# Patient Record
Sex: FEMALE | Race: BLACK OR AFRICAN AMERICAN | NOT HISPANIC OR LATINO | Employment: OTHER | URBAN - METROPOLITAN AREA
[De-identification: names, ages, dates, MRNs, and addresses within clinical notes are randomized per-mention and may not be internally consistent; named-entity substitution may affect disease eponyms.]

---

## 2017-10-09 ENCOUNTER — TRANSCRIBE ORDERS (OUTPATIENT)
Dept: ADMINISTRATIVE | Facility: HOSPITAL | Age: 62
End: 2017-10-09

## 2017-10-09 DIAGNOSIS — Z12.31 VISIT FOR SCREENING MAMMOGRAM: Primary | ICD-10-CM

## 2017-12-04 ENCOUNTER — HOSPITAL ENCOUNTER (OUTPATIENT)
Dept: MAMMOGRAPHY | Facility: HOSPITAL | Age: 62
Discharge: HOME | End: 2017-12-04
Admitting: FAMILY MEDICINE

## 2017-12-04 DIAGNOSIS — Z12.31 VISIT FOR SCREENING MAMMOGRAM: ICD-10-CM

## 2017-12-04 PROCEDURE — G0202 SCR MAMMO BI INCL CAD: HCPCS

## 2017-12-04 PROCEDURE — 77063 BREAST TOMOSYNTHESIS BI: CPT

## 2017-12-04 PROCEDURE — 77067 SCR MAMMO BI INCL CAD: CPT | Performed by: RADIOLOGY

## 2017-12-04 PROCEDURE — 77063 BREAST TOMOSYNTHESIS BI: CPT | Performed by: RADIOLOGY

## 2019-10-08 ENCOUNTER — TRANSCRIBE ORDERS (OUTPATIENT)
Dept: ADMINISTRATIVE | Facility: HOSPITAL | Age: 64
End: 2019-10-08

## 2019-10-08 DIAGNOSIS — Z12.31 VISIT FOR SCREENING MAMMOGRAM: Primary | ICD-10-CM

## 2019-12-18 ENCOUNTER — HOSPITAL ENCOUNTER (OUTPATIENT)
Dept: MAMMOGRAPHY | Facility: HOSPITAL | Age: 64
Discharge: HOME | End: 2019-12-18
Admitting: FAMILY MEDICINE

## 2019-12-18 DIAGNOSIS — Z12.31 VISIT FOR SCREENING MAMMOGRAM: ICD-10-CM

## 2019-12-18 PROCEDURE — 77067 SCR MAMMO BI INCL CAD: CPT

## 2019-12-18 PROCEDURE — 77063 BREAST TOMOSYNTHESIS BI: CPT | Performed by: RADIOLOGY

## 2019-12-18 PROCEDURE — 77067 SCR MAMMO BI INCL CAD: CPT | Performed by: RADIOLOGY

## 2019-12-18 PROCEDURE — 77063 BREAST TOMOSYNTHESIS BI: CPT

## 2020-12-07 ENCOUNTER — TRANSCRIBE ORDERS (OUTPATIENT)
Dept: ADMINISTRATIVE | Facility: HOSPITAL | Age: 65
End: 2020-12-07

## 2020-12-07 DIAGNOSIS — Z12.31 VISIT FOR SCREENING MAMMOGRAM: Primary | ICD-10-CM

## 2020-12-31 ENCOUNTER — HOSPITAL ENCOUNTER (OUTPATIENT)
Dept: MAMMOGRAPHY | Facility: HOSPITAL | Age: 65
Discharge: HOME | End: 2020-12-31
Admitting: PHYSICIAN ASSISTANT

## 2020-12-31 DIAGNOSIS — Z12.31 VISIT FOR SCREENING MAMMOGRAM: ICD-10-CM

## 2020-12-31 PROCEDURE — 77067 SCR MAMMO BI INCL CAD: CPT

## 2020-12-31 PROCEDURE — 77067 SCR MAMMO BI INCL CAD: CPT | Performed by: RADIOLOGY

## 2020-12-31 PROCEDURE — 77063 BREAST TOMOSYNTHESIS BI: CPT | Performed by: RADIOLOGY

## 2020-12-31 PROCEDURE — 77063 BREAST TOMOSYNTHESIS BI: CPT

## 2021-05-05 ENCOUNTER — OFFICE VISIT (OUTPATIENT)
Dept: OBSTETRICS AND GYNECOLOGY | Facility: CLINIC | Age: 66
End: 2021-05-05

## 2021-05-05 VITALS
SYSTOLIC BLOOD PRESSURE: 110 MMHG | HEIGHT: 62 IN | BODY MASS INDEX: 25.98 KG/M2 | WEIGHT: 141.2 LBS | DIASTOLIC BLOOD PRESSURE: 72 MMHG

## 2021-05-05 DIAGNOSIS — Z12.4 SCREENING FOR CERVICAL CANCER: ICD-10-CM

## 2021-05-05 DIAGNOSIS — I10 ESSENTIAL HYPERTENSION: ICD-10-CM

## 2021-05-05 DIAGNOSIS — N95.2 ATROPHIC VAGINITIS: ICD-10-CM

## 2021-05-05 DIAGNOSIS — Z12.11 SCREEN FOR COLON CANCER: ICD-10-CM

## 2021-05-05 DIAGNOSIS — E11.9 TYPE 2 DIABETES MELLITUS WITHOUT COMPLICATION, WITHOUT LONG-TERM CURRENT USE OF INSULIN (HCC): ICD-10-CM

## 2021-05-05 DIAGNOSIS — A60.04 HERPES SIMPLEX VULVOVAGINITIS: ICD-10-CM

## 2021-05-05 DIAGNOSIS — Z01.419 ENCOUNTER FOR ANNUAL ROUTINE GYNECOLOGICAL EXAMINATION: Primary | ICD-10-CM

## 2021-05-05 DIAGNOSIS — Z12.31 ENCOUNTER FOR SCREENING MAMMOGRAM FOR MALIGNANT NEOPLASM OF BREAST: ICD-10-CM

## 2021-05-05 PROBLEM — Z12.39 SCREENING FOR BREAST CANCER: Status: ACTIVE | Noted: 2021-05-05

## 2021-05-05 PROCEDURE — 99386 PREV VISIT NEW AGE 40-64: CPT | Performed by: OBSTETRICS & GYNECOLOGY

## 2021-05-05 RX ORDER — FLUTICASONE PROPIONATE 50 MCG
SPRAY, SUSPENSION (ML) NASAL
COMMUNITY

## 2021-05-05 RX ORDER — VALACYCLOVIR HYDROCHLORIDE 500 MG/1
TABLET, FILM COATED ORAL
COMMUNITY

## 2021-05-05 RX ORDER — LISINOPRIL AND HYDROCHLOROTHIAZIDE 25; 20 MG/1; MG/1
1 TABLET ORAL 2 TIMES DAILY
COMMUNITY
Start: 2021-03-20

## 2021-05-05 RX ORDER — GLYBURIDE 5 MG/1
5 TABLET ORAL 2 TIMES DAILY
COMMUNITY
Start: 2021-03-20

## 2021-05-05 NOTE — PROGRESS NOTES
GYN Annual Exam     CC - Here for annual exam.        HPI  Marium Evans is a 65 y.o. female, , who presents for annual well woman exam.  She is postmenopausal.  Patient denies vaginal bleeding. ..  Patient reports problems with: hot flashes and vaginal dryness. There were no changes to her medical or surgical history since her last visit.. Partner Status: Marital Status: .  New Partners since last visit: no.      Patient does complain of right groin pain abut unsure what could be causing it.     Additional OB/GYN History   Current contraception: contraceptive methods: Post menopausal status  Desires to: do not start contraception  On HRT? No  Last Pap :   Last Completed Pap Smear     This patient has no relevant Health Maintenance data.        History of abnormal Pap smear: no  Family history of uterine, colon, breast, or ovarian cancer: yes - breast cancer- maternal aunt  Performs monthly Self-Breast Exam: no  Last mammogram:   Last Completed Mammogram          MAMMOGRAM (Every 2 Years) Next due on 2022  Mammo Screening Digital Tomosynthesis Bilateral With CAD    2019  Mammo Screening Digital Tomosynthesis Bilateral With CAD    2017  Mammo Screening Digital Tomosynthesis Bilateral With CAD    2016  Mammo Screening Digital Tomosynthesis Bilateral With CAD    2015  Mammo screening bilateral    Only the first 5 history entries have been loaded, but more history exists.              Last colonoscopy:   Last Completed Colonoscopy     This patient has no relevant Health Maintenance data.        Last DEXA: Unknown date and results were Normal  Exercises Regularly: yes  Feelings of Anxiety or Depression: no      Tobacco Usage?: No   OB History        2    Para   2    Term   2            AB        Living           SAB        TAB        Ectopic        Molar        Multiple        Live Births                    Health Maintenance   Topic Date Due   •  "URINE MICROALBUMIN  Never done   • COLORECTAL CANCER SCREENING  Never done   • ANNUAL PHYSICAL  Never done   • Pneumococcal Vaccine 0-64 (1 of 1 - PPSV23) Never done   • ZOSTER VACCINE (1 of 2) Never done   • HEPATITIS C SCREENING  Never done   • DIABETIC FOOT EXAM  Never done   • PAP SMEAR  05/05/2021   • HEMOGLOBIN A1C  Never done   • DIABETIC EYE EXAM  Never done   • INFLUENZA VACCINE  08/01/2021   • Annual Gynecologic Pelvic and Breast Exam  05/06/2022   • MAMMOGRAM  12/31/2022   • TDAP/TD VACCINES (3 - Td) 06/19/2028   • COVID-19 Vaccine  Completed       The additional following portions of the patient's history were reviewed and updated as appropriate: allergies, current medications, past family history, past medical history, past social history, past surgical history and problem list.    Review of Systems   Constitutional: Negative.    HENT: Negative.    Eyes: Negative.    Respiratory: Negative.    Cardiovascular: Negative.    Gastrointestinal: Negative.    Endocrine: Negative.    Genitourinary: Negative.    Musculoskeletal: Negative.    Skin: Negative.    Allergic/Immunologic: Negative.    Neurological: Negative.    Hematological: Negative.    Psychiatric/Behavioral: Negative.      All other systems reviewed and are negative.     I have reviewed and agree with the HPI, ROS, and historical information as entered above. Regis Atkinson MD    Objective   /72   Ht 157.5 cm (62\")   Wt 64 kg (141 lb 3.2 oz)   BMI 25.83 kg/m²     Physical Exam  Vitals and nursing note reviewed. Exam conducted with a chaperone present.   Constitutional:       Appearance: She is well-developed.   HENT:      Head: Normocephalic and atraumatic.   Neck:      Thyroid: No thyroid mass or thyromegaly.   Cardiovascular:      Rate and Rhythm: Normal rate and regular rhythm.      Heart sounds: No murmur heard.     Pulmonary:      Effort: Pulmonary effort is normal. No retractions.      Breath sounds: Normal breath sounds. No " wheezing, rhonchi or rales.   Chest:      Chest wall: No mass or tenderness.      Breasts:         Right: Normal. No mass, nipple discharge, skin change or tenderness.         Left: Normal. No mass, nipple discharge, skin change or tenderness.   Abdominal:      General: Bowel sounds are normal.      Palpations: Abdomen is soft. Abdomen is not rigid. There is no mass.      Tenderness: There is no abdominal tenderness. There is no guarding.      Hernia: No hernia is present. There is no hernia in the left inguinal area.   Genitourinary:     Labia:         Right: No rash, tenderness or lesion.         Left: No rash, tenderness or lesion.       Vagina: Normal. No vaginal discharge or lesions.      Cervix: No cervical motion tenderness, discharge, lesion or cervical bleeding.      Uterus: Normal. Not enlarged, not fixed and not tender.       Adnexa:         Right: No mass or tenderness.          Left: No mass or tenderness.        Rectum: No external hemorrhoid.      Comments: Atrophy and flattening of vaginal rugate.  Cervix without lesions or discharge.  Uterus nontender anteverted.  Ovaries nonpalpable and nontender.  Musculoskeletal:      Cervical back: Normal range of motion. No muscular tenderness.   Neurological:      Mental Status: She is alert and oriented to person, place, and time.   Psychiatric:         Behavior: Behavior normal.            Assessment and Plan    Problem List Items Addressed This Visit     Screening for breast cancer    Overview     Mamm 11/2020; normal         Screen for colon cancer    Overview     Colonoscopy 2017? 5 year repeat.          Atrophic vaginitis    Overview     Declines treatment at this time.         Herpes genitalis    Overview     Continues take Valtrex for suppression.         Relevant Medications    valACYclovir (Valtrex) 500 MG tablet    Hypertension    Overview     Stable on lisinopril with hydrochlorothiazide         Relevant Medications     lisinopril-hydrochlorothiazide (PRINZIDE,ZESTORETIC) 20-25 MG per tablet    Diabetes mellitus (CMS/HCC)    Overview     On glyburide and Metformin.         Relevant Medications    metFORMIN (GLUCOPHAGE) 500 MG tablet    glyburide (DIAbeta) 5 MG tablet    Screening for cervical cancer    Overview     Pap smear done 5/5/2021.           Other Visit Diagnoses     Encounter for annual routine gynecological examination    -  Primary    Relevant Orders    Pap IG, Rfx HPV ASCU          1. GYN annual well woman exam.  65 years of age.  Pap smear done today.  Mammogram due in November 2021.  2. Colonoscopy 2017.?  3. Reviewed monthly self breast exams.  Instructed to call with lumps, pain, or breast discharge.  Yearly mammograms ordered.  4. Recommended use of Vitamin D and getting adequate calcium in her diet. (1500mg)  5. Reviewed BMI and weight loss as preventative health measures.   6. Reccommended Flu Vaccine in Fall of each year.  7. RTC in 1 year or PRN with problems.    Regis Atkinson MD  05/05/2021

## 2021-05-11 DIAGNOSIS — Z01.419 ENCOUNTER FOR ANNUAL ROUTINE GYNECOLOGICAL EXAMINATION: ICD-10-CM

## 2021-10-14 ENCOUNTER — HOSPITAL ENCOUNTER (INPATIENT)
Facility: HOSPITAL | Age: 66
LOS: 2 days | Discharge: HOME | End: 2021-10-17
Attending: EMERGENCY MEDICINE | Admitting: INTERNAL MEDICINE

## 2021-10-14 DIAGNOSIS — R79.89 ELEVATED LACTIC ACID LEVEL: ICD-10-CM

## 2021-10-14 DIAGNOSIS — Z86.39 HISTORY OF DIABETES MELLITUS: ICD-10-CM

## 2021-10-14 DIAGNOSIS — Z98.890 HISTORY OF ABDOMINAL SURGERY: ICD-10-CM

## 2021-10-14 DIAGNOSIS — Z86.79 HISTORY OF HYPERTENSION: ICD-10-CM

## 2021-10-14 DIAGNOSIS — K56.609 SMALL BOWEL OBSTRUCTION (HCC): Primary | ICD-10-CM

## 2021-10-14 DIAGNOSIS — R11.2 NON-INTRACTABLE VOMITING WITH NAUSEA, UNSPECIFIED VOMITING TYPE: ICD-10-CM

## 2021-10-14 LAB
ALBUMIN SERPL-MCNC: 4.9 G/DL (ref 3.5–5.2)
ALBUMIN/GLOB SERPL: 1.8 G/DL
ALP SERPL-CCNC: 81 U/L (ref 39–117)
ALT SERPL W P-5'-P-CCNC: 6 U/L (ref 1–33)
ANION GAP SERPL CALCULATED.3IONS-SCNC: 16 MMOL/L (ref 5–15)
AST SERPL-CCNC: 15 U/L (ref 1–32)
BASOPHILS # BLD AUTO: 0.04 10*3/MM3 (ref 0–0.2)
BASOPHILS NFR BLD AUTO: 0.6 % (ref 0–1.5)
BILIRUB SERPL-MCNC: 0.3 MG/DL (ref 0–1.2)
BUN SERPL-MCNC: 22 MG/DL (ref 8–23)
BUN/CREAT SERPL: 20.8 (ref 7–25)
CALCIUM SPEC-SCNC: 10 MG/DL (ref 8.6–10.5)
CHLORIDE SERPL-SCNC: 101 MMOL/L (ref 98–107)
CO2 SERPL-SCNC: 24 MMOL/L (ref 22–29)
CREAT SERPL-MCNC: 1.06 MG/DL (ref 0.57–1)
D-LACTATE SERPL-SCNC: 2.6 MMOL/L (ref 0.5–2)
DEPRECATED RDW RBC AUTO: 39.9 FL (ref 37–54)
DEVELOPER EXPIRATION DATE: NORMAL
DEVELOPER LOT NUMBER: NORMAL
EOSINOPHIL # BLD AUTO: 0.36 10*3/MM3 (ref 0–0.4)
EOSINOPHIL NFR BLD AUTO: 5.4 % (ref 0.3–6.2)
ERYTHROCYTE [DISTWIDTH] IN BLOOD BY AUTOMATED COUNT: 12.9 % (ref 12.3–15.4)
EXPIRATION DATE: NORMAL
FECAL OCCULT BLOOD SCREEN, POC: NEGATIVE
GFR SERPL CREATININE-BSD FRML MDRD: 63 ML/MIN/1.73
GLOBULIN UR ELPH-MCNC: 2.8 GM/DL
GLUCOSE SERPL-MCNC: 179 MG/DL (ref 65–99)
HCT VFR BLD AUTO: 40.6 % (ref 34–46.6)
HGB BLD-MCNC: 13.4 G/DL (ref 12–15.9)
HOLD SPECIMEN: NORMAL
HOLD SPECIMEN: NORMAL
IMM GRANULOCYTES # BLD AUTO: 0.03 10*3/MM3 (ref 0–0.05)
IMM GRANULOCYTES NFR BLD AUTO: 0.5 % (ref 0–0.5)
LIPASE SERPL-CCNC: 38 U/L (ref 13–60)
LYMPHOCYTES # BLD AUTO: 1.43 10*3/MM3 (ref 0.7–3.1)
LYMPHOCYTES NFR BLD AUTO: 21.5 % (ref 19.6–45.3)
Lab: NORMAL
MCH RBC QN AUTO: 28.2 PG (ref 26.6–33)
MCHC RBC AUTO-ENTMCNC: 33 G/DL (ref 31.5–35.7)
MCV RBC AUTO: 85.3 FL (ref 79–97)
MONOCYTES # BLD AUTO: 0.45 10*3/MM3 (ref 0.1–0.9)
MONOCYTES NFR BLD AUTO: 6.8 % (ref 5–12)
NEGATIVE CONTROL: NEGATIVE
NEUTROPHILS NFR BLD AUTO: 4.35 10*3/MM3 (ref 1.7–7)
NEUTROPHILS NFR BLD AUTO: 65.2 % (ref 42.7–76)
NRBC BLD AUTO-RTO: 0 /100 WBC (ref 0–0.2)
PLATELET # BLD AUTO: 224 10*3/MM3 (ref 140–450)
PMV BLD AUTO: 11.6 FL (ref 6–12)
POSITIVE CONTROL: POSITIVE
POTASSIUM SERPL-SCNC: 3.7 MMOL/L (ref 3.5–5.2)
PROT SERPL-MCNC: 7.7 G/DL (ref 6–8.5)
RBC # BLD AUTO: 4.76 10*6/MM3 (ref 3.77–5.28)
SODIUM SERPL-SCNC: 141 MMOL/L (ref 136–145)
WBC # BLD AUTO: 6.66 10*3/MM3 (ref 3.4–10.8)
WHOLE BLOOD HOLD SPECIMEN: NORMAL
WHOLE BLOOD HOLD SPECIMEN: NORMAL

## 2021-10-14 PROCEDURE — 99284 EMERGENCY DEPT VISIT MOD MDM: CPT

## 2021-10-14 PROCEDURE — 25010000002 ONDANSETRON PER 1 MG: Performed by: EMERGENCY MEDICINE

## 2021-10-14 PROCEDURE — 80053 COMPREHEN METABOLIC PANEL: CPT | Performed by: EMERGENCY MEDICINE

## 2021-10-14 PROCEDURE — 25010000002 HYDROMORPHONE PER 4 MG: Performed by: EMERGENCY MEDICINE

## 2021-10-14 PROCEDURE — 82270 OCCULT BLOOD FECES: CPT | Performed by: PHYSICIAN ASSISTANT

## 2021-10-14 PROCEDURE — 85025 COMPLETE CBC W/AUTO DIFF WBC: CPT | Performed by: EMERGENCY MEDICINE

## 2021-10-14 PROCEDURE — 83690 ASSAY OF LIPASE: CPT | Performed by: EMERGENCY MEDICINE

## 2021-10-14 PROCEDURE — 83605 ASSAY OF LACTIC ACID: CPT | Performed by: EMERGENCY MEDICINE

## 2021-10-14 RX ORDER — PANTOPRAZOLE SODIUM 40 MG/10ML
40 INJECTION, POWDER, LYOPHILIZED, FOR SOLUTION INTRAVENOUS ONCE
Status: COMPLETED | OUTPATIENT
Start: 2021-10-14 | End: 2021-10-14

## 2021-10-14 RX ORDER — HYDROMORPHONE HYDROCHLORIDE 1 MG/ML
0.5 INJECTION, SOLUTION INTRAMUSCULAR; INTRAVENOUS; SUBCUTANEOUS ONCE
Status: COMPLETED | OUTPATIENT
Start: 2021-10-14 | End: 2021-10-14

## 2021-10-14 RX ORDER — SODIUM CHLORIDE 9 MG/ML
10 INJECTION INTRAVENOUS AS NEEDED
Status: DISCONTINUED | OUTPATIENT
Start: 2021-10-14 | End: 2021-10-17 | Stop reason: HOSPADM

## 2021-10-14 RX ORDER — ONDANSETRON 2 MG/ML
4 INJECTION INTRAMUSCULAR; INTRAVENOUS ONCE
Status: COMPLETED | OUTPATIENT
Start: 2021-10-14 | End: 2021-10-14

## 2021-10-14 RX ADMIN — PANTOPRAZOLE SODIUM 40 MG: 40 INJECTION, POWDER, FOR SOLUTION INTRAVENOUS at 23:17

## 2021-10-14 RX ADMIN — SODIUM CHLORIDE 1000 ML: 9 INJECTION, SOLUTION INTRAVENOUS at 23:17

## 2021-10-14 RX ADMIN — HYDROMORPHONE HYDROCHLORIDE 0.5 MG: 1 INJECTION, SOLUTION INTRAMUSCULAR; INTRAVENOUS; SUBCUTANEOUS at 23:17

## 2021-10-14 RX ADMIN — ONDANSETRON 4 MG: 2 INJECTION INTRAMUSCULAR; INTRAVENOUS at 23:17

## 2021-10-15 ENCOUNTER — APPOINTMENT (OUTPATIENT)
Dept: CT IMAGING | Facility: HOSPITAL | Age: 66
End: 2021-10-15

## 2021-10-15 PROBLEM — E87.20 LACTIC ACIDOSIS: Status: ACTIVE | Noted: 2021-10-15

## 2021-10-15 PROBLEM — K56.609 SMALL BOWEL OBSTRUCTION (HCC): Status: ACTIVE | Noted: 2021-10-15

## 2021-10-15 PROBLEM — R79.89 ELEVATED SERUM CREATININE: Status: ACTIVE | Noted: 2021-10-15

## 2021-10-15 PROBLEM — K56.609 SBO (SMALL BOWEL OBSTRUCTION) (HCC): Status: ACTIVE | Noted: 2021-10-15

## 2021-10-15 LAB
ALBUMIN SERPL-MCNC: 4 G/DL (ref 3.5–5.2)
ALBUMIN/GLOB SERPL: 1.7 G/DL
ALP SERPL-CCNC: 65 U/L (ref 39–117)
ALT SERPL W P-5'-P-CCNC: 5 U/L (ref 1–33)
ANION GAP SERPL CALCULATED.3IONS-SCNC: 12 MMOL/L (ref 5–15)
AST SERPL-CCNC: 12 U/L (ref 1–32)
BASOPHILS # BLD MANUAL: 0 10*3/MM3 (ref 0–0.2)
BASOPHILS NFR BLD AUTO: 0 % (ref 0–1.5)
BILIRUB SERPL-MCNC: 0.4 MG/DL (ref 0–1.2)
BILIRUB UR QL STRIP: NEGATIVE
BUN SERPL-MCNC: 16 MG/DL (ref 8–23)
BUN/CREAT SERPL: 17.4 (ref 7–25)
CALCIUM SPEC-SCNC: 9.1 MG/DL (ref 8.6–10.5)
CHLORIDE SERPL-SCNC: 104 MMOL/L (ref 98–107)
CLARITY UR: CLEAR
CO2 SERPL-SCNC: 23 MMOL/L (ref 22–29)
COLOR UR: YELLOW
CREAT SERPL-MCNC: 0.92 MG/DL (ref 0.57–1)
D-LACTATE SERPL-SCNC: 1.1 MMOL/L (ref 0.5–2)
D-LACTATE SERPL-SCNC: 2 MMOL/L (ref 0.5–2)
DEPRECATED RDW RBC AUTO: 40.7 FL (ref 37–54)
EOSINOPHIL # BLD MANUAL: 0.29 10*3/MM3 (ref 0–0.4)
EOSINOPHIL NFR BLD MANUAL: 5 % (ref 0.3–6.2)
ERYTHROCYTE [DISTWIDTH] IN BLOOD BY AUTOMATED COUNT: 13.1 % (ref 12.3–15.4)
GFR SERPL CREATININE-BSD FRML MDRD: 74 ML/MIN/1.73
GLOBULIN UR ELPH-MCNC: 2.4 GM/DL
GLUCOSE BLDC GLUCOMTR-MCNC: 109 MG/DL (ref 70–130)
GLUCOSE BLDC GLUCOMTR-MCNC: 136 MG/DL (ref 70–130)
GLUCOSE BLDC GLUCOMTR-MCNC: 80 MG/DL (ref 70–130)
GLUCOSE BLDC GLUCOMTR-MCNC: 91 MG/DL (ref 70–130)
GLUCOSE SERPL-MCNC: 139 MG/DL (ref 65–99)
GLUCOSE UR STRIP-MCNC: ABNORMAL MG/DL
HBA1C MFR BLD: 8.5 % (ref 4.8–5.6)
HCT VFR BLD AUTO: 37.1 % (ref 34–46.6)
HGB BLD-MCNC: 12.3 G/DL (ref 12–15.9)
HGB UR QL STRIP.AUTO: NEGATIVE
HOLD SPECIMEN: NORMAL
KETONES UR QL STRIP: NEGATIVE
LEUKOCYTE ESTERASE UR QL STRIP.AUTO: NEGATIVE
LYMPHOCYTES # BLD MANUAL: 1.58 10*3/MM3 (ref 0.7–3.1)
LYMPHOCYTES NFR BLD MANUAL: 27 % (ref 19.6–45.3)
LYMPHOCYTES NFR BLD MANUAL: 7 % (ref 5–12)
MCH RBC QN AUTO: 28.3 PG (ref 26.6–33)
MCHC RBC AUTO-ENTMCNC: 33.2 G/DL (ref 31.5–35.7)
MCV RBC AUTO: 85.3 FL (ref 79–97)
MONOCYTES # BLD AUTO: 0.41 10*3/MM3 (ref 0.1–0.9)
NEUTROPHILS # BLD AUTO: 3.56 10*3/MM3 (ref 1.7–7)
NEUTROPHILS NFR BLD MANUAL: 60 % (ref 42.7–76)
NEUTS BAND NFR BLD MANUAL: 1 % (ref 0–5)
NITRITE UR QL STRIP: NEGATIVE
NRBC SPEC MANUAL: 0 /100 WBC (ref 0–0.2)
PH UR STRIP.AUTO: 7 [PH] (ref 5–8)
PLAT MORPH BLD: NORMAL
PLATELET # BLD AUTO: 161 10*3/MM3 (ref 140–450)
PMV BLD AUTO: 12 FL (ref 6–12)
POTASSIUM SERPL-SCNC: 4 MMOL/L (ref 3.5–5.2)
PROT SERPL-MCNC: 6.4 G/DL (ref 6–8.5)
PROT UR QL STRIP: NEGATIVE
RBC # BLD AUTO: 4.35 10*6/MM3 (ref 3.77–5.28)
RBC MORPH BLD: NORMAL
SARS-COV-2 RDRP RESP QL NAA+PROBE: NORMAL
SODIUM SERPL-SCNC: 139 MMOL/L (ref 136–145)
SP GR UR STRIP: 1.05 (ref 1–1.03)
UROBILINOGEN UR QL STRIP: ABNORMAL
WBC # BLD AUTO: 5.84 10*3/MM3 (ref 3.4–10.8)
WBC MORPH BLD: NORMAL

## 2021-10-15 PROCEDURE — 80053 COMPREHEN METABOLIC PANEL: CPT | Performed by: NURSE PRACTITIONER

## 2021-10-15 PROCEDURE — 25010000002 PIPERACILLIN SOD-TAZOBACTAM PER 1 G: Performed by: INTERNAL MEDICINE

## 2021-10-15 PROCEDURE — 83605 ASSAY OF LACTIC ACID: CPT | Performed by: EMERGENCY MEDICINE

## 2021-10-15 PROCEDURE — 87635 SARS-COV-2 COVID-19 AMP PRB: CPT | Performed by: PHYSICIAN ASSISTANT

## 2021-10-15 PROCEDURE — 81003 URINALYSIS AUTO W/O SCOPE: CPT | Performed by: EMERGENCY MEDICINE

## 2021-10-15 PROCEDURE — 25010000002 PIPERACILLIN SOD-TAZOBACTAM PER 1 G: Performed by: SURGERY

## 2021-10-15 PROCEDURE — 83605 ASSAY OF LACTIC ACID: CPT | Performed by: SURGERY

## 2021-10-15 PROCEDURE — 82962 GLUCOSE BLOOD TEST: CPT

## 2021-10-15 PROCEDURE — 85007 BL SMEAR W/DIFF WBC COUNT: CPT | Performed by: NURSE PRACTITIONER

## 2021-10-15 PROCEDURE — 25010000002 HEPARIN (PORCINE) PER 1000 UNITS: Performed by: SURGERY

## 2021-10-15 PROCEDURE — 83036 HEMOGLOBIN GLYCOSYLATED A1C: CPT | Performed by: NURSE PRACTITIONER

## 2021-10-15 PROCEDURE — 99223 1ST HOSP IP/OBS HIGH 75: CPT | Performed by: INTERNAL MEDICINE

## 2021-10-15 PROCEDURE — 85027 COMPLETE CBC AUTOMATED: CPT | Performed by: NURSE PRACTITIONER

## 2021-10-15 PROCEDURE — 74177 CT ABD & PELVIS W/CONTRAST: CPT

## 2021-10-15 PROCEDURE — 25010000002 IOPAMIDOL 61 % SOLUTION: Performed by: EMERGENCY MEDICINE

## 2021-10-15 RX ORDER — ONDANSETRON 2 MG/ML
4 INJECTION INTRAMUSCULAR; INTRAVENOUS EVERY 6 HOURS PRN
Status: DISCONTINUED | OUTPATIENT
Start: 2021-10-15 | End: 2021-10-17 | Stop reason: HOSPADM

## 2021-10-15 RX ORDER — ONDANSETRON 4 MG/1
4 TABLET, FILM COATED ORAL EVERY 6 HOURS PRN
Status: DISCONTINUED | OUTPATIENT
Start: 2021-10-15 | End: 2021-10-17 | Stop reason: HOSPADM

## 2021-10-15 RX ORDER — FLUTICASONE PROPIONATE 50 MCG
1 SPRAY, SUSPENSION (ML) NASAL 2 TIMES DAILY
Status: DISCONTINUED | OUTPATIENT
Start: 2021-10-15 | End: 2021-10-17 | Stop reason: HOSPADM

## 2021-10-15 RX ORDER — ACETAMINOPHEN 325 MG/1
650 TABLET ORAL EVERY 6 HOURS PRN
Status: DISCONTINUED | OUTPATIENT
Start: 2021-10-15 | End: 2021-10-17 | Stop reason: HOSPADM

## 2021-10-15 RX ORDER — SODIUM CHLORIDE, SODIUM LACTATE, POTASSIUM CHLORIDE, CALCIUM CHLORIDE 600; 310; 30; 20 MG/100ML; MG/100ML; MG/100ML; MG/100ML
100 INJECTION, SOLUTION INTRAVENOUS CONTINUOUS
Status: DISCONTINUED | OUTPATIENT
Start: 2021-10-15 | End: 2021-10-15

## 2021-10-15 RX ORDER — SODIUM CHLORIDE 450 MG/100ML
100 INJECTION, SOLUTION INTRAVENOUS CONTINUOUS
Status: DISCONTINUED | OUTPATIENT
Start: 2021-10-15 | End: 2021-10-17

## 2021-10-15 RX ORDER — HYDROMORPHONE HYDROCHLORIDE 1 MG/ML
0.5 INJECTION, SOLUTION INTRAMUSCULAR; INTRAVENOUS; SUBCUTANEOUS
Status: DISCONTINUED | OUTPATIENT
Start: 2021-10-15 | End: 2021-10-15

## 2021-10-15 RX ORDER — DEXTROSE MONOHYDRATE 25 G/50ML
25 INJECTION, SOLUTION INTRAVENOUS
Status: DISCONTINUED | OUTPATIENT
Start: 2021-10-15 | End: 2021-10-16

## 2021-10-15 RX ORDER — SODIUM CHLORIDE, SODIUM LACTATE, POTASSIUM CHLORIDE, CALCIUM CHLORIDE 600; 310; 30; 20 MG/100ML; MG/100ML; MG/100ML; MG/100ML
125 INJECTION, SOLUTION INTRAVENOUS CONTINUOUS
Status: DISCONTINUED | OUTPATIENT
Start: 2021-10-15 | End: 2021-10-15

## 2021-10-15 RX ORDER — METAXALONE 800 MG/1
800 TABLET ORAL 3 TIMES DAILY PRN
COMMUNITY

## 2021-10-15 RX ORDER — NALOXONE HCL 0.4 MG/ML
0.4 VIAL (ML) INJECTION
Status: DISCONTINUED | OUTPATIENT
Start: 2021-10-15 | End: 2021-10-17 | Stop reason: HOSPADM

## 2021-10-15 RX ORDER — NICOTINE POLACRILEX 4 MG
15 LOZENGE BUCCAL
Status: DISCONTINUED | OUTPATIENT
Start: 2021-10-15 | End: 2021-10-16

## 2021-10-15 RX ORDER — FAMOTIDINE 10 MG/ML
20 INJECTION, SOLUTION INTRAVENOUS EVERY 12 HOURS SCHEDULED
Status: DISCONTINUED | OUTPATIENT
Start: 2021-10-15 | End: 2021-10-17

## 2021-10-15 RX ORDER — HEPARIN SODIUM 5000 [USP'U]/ML
5000 INJECTION, SOLUTION INTRAVENOUS; SUBCUTANEOUS EVERY 8 HOURS SCHEDULED
Status: DISCONTINUED | OUTPATIENT
Start: 2021-10-15 | End: 2021-10-17 | Stop reason: HOSPADM

## 2021-10-15 RX ADMIN — SODIUM CHLORIDE, POTASSIUM CHLORIDE, SODIUM LACTATE AND CALCIUM CHLORIDE 125 ML/HR: 600; 310; 30; 20 INJECTION, SOLUTION INTRAVENOUS at 06:24

## 2021-10-15 RX ADMIN — FLUTICASONE PROPIONATE 1 SPRAY: 50 SPRAY, METERED NASAL at 09:35

## 2021-10-15 RX ADMIN — SODIUM CHLORIDE 100 ML/HR: 4.5 INJECTION, SOLUTION INTRAVENOUS at 18:39

## 2021-10-15 RX ADMIN — TAZOBACTAM SODIUM AND PIPERACILLIN SODIUM 3.38 G: 375; 3 INJECTION, SOLUTION INTRAVENOUS at 20:08

## 2021-10-15 RX ADMIN — HEPARIN SODIUM 5000 UNITS: 5000 INJECTION, SOLUTION INTRAVENOUS; SUBCUTANEOUS at 20:07

## 2021-10-15 RX ADMIN — HEPARIN SODIUM 5000 UNITS: 5000 INJECTION, SOLUTION INTRAVENOUS; SUBCUTANEOUS at 09:34

## 2021-10-15 RX ADMIN — FAMOTIDINE 20 MG: 10 INJECTION, SOLUTION INTRAVENOUS at 09:35

## 2021-10-15 RX ADMIN — IOPAMIDOL 100 ML: 612 INJECTION, SOLUTION INTRAVENOUS at 00:24

## 2021-10-15 RX ADMIN — ACETAMINOPHEN 650 MG: 325 TABLET, FILM COATED ORAL at 18:39

## 2021-10-15 RX ADMIN — SODIUM CHLORIDE, POTASSIUM CHLORIDE, SODIUM LACTATE AND CALCIUM CHLORIDE 100 ML/HR: 600; 310; 30; 20 INJECTION, SOLUTION INTRAVENOUS at 03:41

## 2021-10-15 RX ADMIN — FLUTICASONE PROPIONATE 1 SPRAY: 50 SPRAY, METERED NASAL at 20:07

## 2021-10-15 RX ADMIN — HEPARIN SODIUM 5000 UNITS: 5000 INJECTION, SOLUTION INTRAVENOUS; SUBCUTANEOUS at 17:02

## 2021-10-15 RX ADMIN — SODIUM CHLORIDE, POTASSIUM CHLORIDE, SODIUM LACTATE AND CALCIUM CHLORIDE 125 ML/HR: 600; 310; 30; 20 INJECTION, SOLUTION INTRAVENOUS at 17:34

## 2021-10-15 RX ADMIN — FAMOTIDINE 20 MG: 10 INJECTION, SOLUTION INTRAVENOUS at 20:07

## 2021-10-15 RX ADMIN — TAZOBACTAM SODIUM AND PIPERACILLIN SODIUM 3.38 G: 375; 3 INJECTION, SOLUTION INTRAVENOUS at 17:02

## 2021-10-15 NOTE — ED PROVIDER NOTES
Subjective   This is a 66-year-old female that presents to the ER with sudden onset of severe periumbilical abdominal pain.  She described it as sharp, cramping, and initially waxing and waning, but now becoming more constant.  Patient reports nausea with vomiting x4 episodes.  She did have a bowel movement earlier this evening that was dark.  She denies aspirin or frequent NSAID use and denies alcohol use.  Patient tried OTC anti-gas medication as well as Pepto-Bismol without any relief.  Patient has previous history of multiple abdominal surgeries including hysterectomy, oophorectomy, appendectomy, bilateral tubal ligation, and abdominal adhesion surgery.  Patient says that the abdominal adhesion surgery was closely after her hysterectomy in 2003.  She says that her last EGD/colonoscopy was in 2019 and patient did have evidence of peptic ulcer disease and a benign colon polyp.  Patient denies fever or chills.  She denies any cough, congestion, chest pain, or shortness of breath.  She denies dysuria, urgency, or frequency.  Past medical history is significant for type 2 diabetes mellitus, hypertension, and osteoarthritis.      History provided by:  Patient  Abdominal Pain  Pain location:  Periumbilical  Pain quality: cramping and sharp    Pain radiates to:  Does not radiate  Pain severity:  Severe  Onset quality:  Sudden  Duration:  4 hours  Timing:  Intermittent  Progression:  Waxing and waning  Chronicity:  New  Context: previous surgery (appendectomy.)    Context: not alcohol use    Context comment:  Pt reported onset of periumbilical sharp, cramping pain that is waxing and waning for the past 4 hours.  Nausea with vomiting x4 today.  Last BM was this evening, dark.  Relieved by:  Nothing  Worsened by:  Nothing  Ineffective treatments:  OTC medications (Gas pills OTC and pepto bismol.)  Associated symptoms: chills, nausea and vomiting (Emesis x 4 today)    Associated symptoms: no chest pain, no cough, no  diarrhea, no dysuria, no fever and no shortness of breath    Risk factors: multiple surgeries    Risk factors: no alcohol abuse and no NSAID use    Risk factors comment:  EGD[facility]Col[facility]oscopy in 2019 through [facility].  Told she had a gastric ulcer and benign colon polyp.      Review of Systems   Constitutional: Positive for appetite change and chills. Negative for diaphoresis and fever.   HENT: Negative.    Respiratory: Negative.  Negative for cough, chest tightness and shortness of breath.    Cardiovascular: Negative.  Negative for chest pain, palpitations and leg swelling.   Gastrointestinal: Positive for abdominal pain (periumbilical sharp, cramping pain.), nausea and vomiting (Emesis x 4 today). Negative for diarrhea.        Dark stool this evening.  No ASA or frequent NSAID use.  Patient has history of multiple abdominal surgeries, including surgery due to abdominal adhesions.   Genitourinary: Negative.  Negative for dysuria, flank pain, frequency and urgency.   Musculoskeletal: Negative.  Negative for back pain.   Neurological: Negative.    All other systems reviewed and are negative.      Past Medical History:   Diagnosis Date   • Arthritis    • Atrophic vaginitis    • Diabetes mellitus (HCC)    • Herpes genitalis    • Hypertension    • Ovarian cyst    • Peritoneal adhesions    • Vaginal fibroids     uterine       No Known Allergies    Past Surgical History:   Procedure Laterality Date   • APPENDECTOMY     • BUNIONECTOMY     •  SECTION     • HYSTERECTOMY     • LYSIS OF ABDOMINAL ADHESIONS     • MOUTH SURGERY      gum surgery   • OOPHORECTOMY     • TRIGGER FINGER RELEASE     • TUBAL ABDOMINAL LIGATION         Family History   Problem Relation Age of Onset   • Breast cancer Maternal Aunt    • Arthritis Mother    • Diabetes Mother    • Hypertension Mother    • Arthritis Father    • Hypertension Father    • Stroke Maternal Grandfather    • Ovarian cancer Neg Hx        Social History      Socioeconomic History   • Marital status:    Tobacco Use   • Smoking status: Never Smoker   • Smokeless tobacco: Never Used   Substance and Sexual Activity   • Alcohol use: Never   • Drug use: Never   • Sexual activity: Never           Objective   Physical Exam  Vitals and nursing note reviewed.   Constitutional:       Appearance: Normal appearance.   HENT:      Head: Normocephalic and atraumatic.      Right Ear: Tympanic membrane normal.      Left Ear: Tympanic membrane normal.      Nose: Nose normal.      Mouth/Throat:      Mouth: Mucous membranes are moist.      Pharynx: Oropharynx is clear.   Eyes:      Extraocular Movements: Extraocular movements intact.      Conjunctiva/sclera: Conjunctivae normal.      Pupils: Pupils are equal, round, and reactive to light.   Cardiovascular:      Rate and Rhythm: Normal rate and regular rhythm.  No extrasystoles are present.     Pulses: Normal pulses.      Heart sounds: Normal heart sounds.      Comments: Regular rate and rhythm.  No ectopy.  Pulmonary:      Effort: Pulmonary effort is normal.      Breath sounds: Normal breath sounds.      Comments: Lungs are clear to auscultation bilaterally.  Abdominal:      General: Bowel sounds are decreased. There is distension.      Palpations: Abdomen is soft.      Tenderness: There is abdominal tenderness in the periumbilical area. There is guarding. There is no right CVA tenderness, left CVA tenderness or rebound.          Comments: Abdomen soft with mild distention.  Decreased bowel sounds with moderate periumbilical tenderness.  Involuntary guarding.  No flank or CVA tenderness.   Musculoskeletal:         General: Normal range of motion.      Cervical back: Normal range of motion and neck supple.      Right lower leg: No edema.      Left lower leg: No edema.   Skin:     General: Skin is warm and dry.   Neurological:      General: No focal deficit present.      Mental Status: She is alert.   Psychiatric:         Mood and  Affect: Mood is anxious.         Speech: Speech normal.         Behavior: Behavior normal. Behavior is cooperative.         Cognition and Memory: Cognition normal.      Comments: Anxious and uncomfortable secondary to abdominal pain.         Procedures           ED Course  ED Course as of 10/15/21 0207   Fri Oct 15, 2021   0119 CBC revealed normal white blood cell count at 6.66 with normal differential.  Chemistries were essentially normal except for mild bump in creatinine to 1.06 and serum glucose of 179.  Lactic acid was elevated at 2.6 and lipase is 38.  Stool guaiac was negative.  Urinalysis reveals glucosuria but no acute infectious process.  CT of the abdomen/pelvis with contrast reveals long segment of small bowel dilatation within the central abdomen and pelvis to the right of midline with dilated segment showing considerable wall thickening and some adjacent mesenteric edema.  Appearance is concerning for closed-loop obstruction with secondary bowel wall edema.  Adhesion or internal hernia should be considered.  Primary small bowel inflammation due to Crohn's disease could appear similarly.  Remaining segments of small bowel and colon are normal in appearance.  No abscess or free intraperitoneal air.  Discussed the case with Dr. Barboza, general surgery.  He is coming in to evaluate the patient and said to keep patient NPO and admit to the hospitalist.  I will go update the patient and spouse on results and need for admission. [FC]   0156 Discussed the case with Dr. Mello, hospitalist.  She is agreeable to admission with close general surgery consult.  I updated patient and spouse on all results and need for admission.  They were agreeable to admission.  I performed rapid Covid testing for bed placement, and in case of need for surgical intervention. [FC]      ED Course User Index  [FC] Winter Coronado, NAHID                 Recent Results (from the past 24 hour(s))   Comprehensive Metabolic Panel     Collection Time: 10/14/21 10:23 PM    Specimen: Blood   Result Value Ref Range    Glucose 179 (H) 65 - 99 mg/dL    BUN 22 8 - 23 mg/dL    Creatinine 1.06 (H) 0.57 - 1.00 mg/dL    Sodium 141 136 - 145 mmol/L    Potassium 3.7 3.5 - 5.2 mmol/L    Chloride 101 98 - 107 mmol/L    CO2 24.0 22.0 - 29.0 mmol/L    Calcium 10.0 8.6 - 10.5 mg/dL    Total Protein 7.7 6.0 - 8.5 g/dL    Albumin 4.90 3.50 - 5.20 g/dL    ALT (SGPT) 6 1 - 33 U/L    AST (SGOT) 15 1 - 32 U/L    Alkaline Phosphatase 81 39 - 117 U/L    Total Bilirubin 0.3 0.0 - 1.2 mg/dL    eGFR  African Amer 63 >60 mL/min/1.73    Globulin 2.8 gm/dL    A/G Ratio 1.8 g/dL    BUN/Creatinine Ratio 20.8 7.0 - 25.0    Anion Gap 16.0 (H) 5.0 - 15.0 mmol/L   Lipase    Collection Time: 10/14/21 10:23 PM    Specimen: Blood   Result Value Ref Range    Lipase 38 13 - 60 U/L   Lactic Acid, Plasma    Collection Time: 10/14/21 10:23 PM    Specimen: Blood   Result Value Ref Range    Lactate 2.6 (C) 0.5 - 2.0 mmol/L   Green Top (Gel)    Collection Time: 10/14/21 10:23 PM   Result Value Ref Range    Extra Tube Hold for add-ons.    Lavender Top    Collection Time: 10/14/21 10:23 PM   Result Value Ref Range    Extra Tube hold for add-on    Gold Top - SST    Collection Time: 10/14/21 10:23 PM   Result Value Ref Range    Extra Tube Hold for add-ons.    Light Blue Top    Collection Time: 10/14/21 10:23 PM   Result Value Ref Range    Extra Tube hold for add-on    CBC Auto Differential    Collection Time: 10/14/21 10:23 PM    Specimen: Blood   Result Value Ref Range    WBC 6.66 3.40 - 10.80 10*3/mm3    RBC 4.76 3.77 - 5.28 10*6/mm3    Hemoglobin 13.4 12.0 - 15.9 g/dL    Hematocrit 40.6 34.0 - 46.6 %    MCV 85.3 79.0 - 97.0 fL    MCH 28.2 26.6 - 33.0 pg    MCHC 33.0 31.5 - 35.7 g/dL    RDW 12.9 12.3 - 15.4 %    RDW-SD 39.9 37.0 - 54.0 fl    MPV 11.6 6.0 - 12.0 fL    Platelets 224 140 - 450 10*3/mm3    Neutrophil % 65.2 42.7 - 76.0 %    Lymphocyte % 21.5 19.6 - 45.3 %    Monocyte % 6.8 5.0 -  12.0 %    Eosinophil % 5.4 0.3 - 6.2 %    Basophil % 0.6 0.0 - 1.5 %    Immature Grans % 0.5 0.0 - 0.5 %    Neutrophils, Absolute 4.35 1.70 - 7.00 10*3/mm3    Lymphocytes, Absolute 1.43 0.70 - 3.10 10*3/mm3    Monocytes, Absolute 0.45 0.10 - 0.90 10*3/mm3    Eosinophils, Absolute 0.36 0.00 - 0.40 10*3/mm3    Basophils, Absolute 0.04 0.00 - 0.20 10*3/mm3    Immature Grans, Absolute 0.03 0.00 - 0.05 10*3/mm3    nRBC 0.0 0.0 - 0.2 /100 WBC   POC Occult Blood Stool    Collection Time: 10/14/21 11:38 PM    Specimen: Per Rectum; Stool   Result Value Ref Range    Fecal Occult Blood Negative Negative    Lot Number [id number]     Expiration Date 2-2,024     DEVELOPER LOT NUMBER [id number]     DEVELOPER EXPIRATION DATE 04/25/2024     Positive Control Positive Positive    Negative Control Negative Negative   Urinalysis With Microscopic If Indicated (No Culture) - Urine, Clean Catch    Collection Time: 10/15/21  1:36 AM    Specimen: Urine, Clean Catch   Result Value Ref Range    Color, UA Yellow Yellow, Straw    Appearance, UA Clear Clear    pH, UA 7.0 5.0 - 8.0    Specific Gravity, UA 1.053 (H) 1.001 - 1.030    Glucose,  mg/dL (Trace) (A) Negative    Ketones, UA Negative Negative    Bilirubin, UA Negative Negative    Blood, UA Negative Negative    Protein, UA Negative Negative    Leuk Esterase, UA Negative Negative    Nitrite, UA Negative Negative    Urobilinogen, UA 0.2 E.U./dL 0.2 - 1.0 E.U./dL     Note: In addition to lab results from this visit, the labs listed above may include labs taken at another facility or during a different encounter within the last 24 hours. Please correlate lab times with ED admission and discharge times for further clarification of the services performed during this visit.    CT Abdomen Pelvis With Contrast   Final Result   1.  Long segment small bowel dilatation within the central abdomen and pelvis to the right of midline with dilated segments showing considerable wall thickening and some  "adjacent mesenteric edema. The appearance is concerning for closed loop obstruction   with secondary bowel wall edema. Adhesion or internal hernia should be considered. Primary small bowel inflammation due to Crohn's disease could appear similarly.   2.  Remaining segments of small bowel and colon are normal in appearance. No abscess or free intraperitoneal air.      Signer Name: Kuldip Tavarez MD    Signed: 10/15/2021 12:48 AM    Workstation Name: [facility]     [facility]        Vitals:    10/14/21 2153   BP: (!) 182/84   BP Location: Right arm   Patient Position: Sitting   Pulse: 71   Resp: 18   Temp: 97.1 °F (36.2 °C)   TempSrc: Oral   SpO2: 100%   Weight: 61.7 kg (136 lb)   Height: 162.6 cm (64\")     Medications   Sodium Chloride (PF) 0.9 % 10 mL (has no administration in time range)   sodium chloride 0.9 % bolus 1,000 mL (0 mL Intravenous Stopped 10/15/21 0148)   pantoprazole (PROTONIX) injection 40 mg (40 mg Intravenous Given 10/14/21 2317)   ondansetron (ZOFRAN) injection 4 mg (4 mg Intravenous Given 10/14/21 2317)   HYDROmorphone (DILAUDID) injection 0.5 mg (0.5 mg Intravenous Given 10/14/21 2317)   iopamidol (ISOVUE-300) 61 % injection 100 mL (100 mL Intravenous Given 10/15/21 0024)     ECG/EMG Results (last 24 hours)     ** No results found for the last 24 hours. **        No orders to display                                    MDM    Final diagnoses:   Small bowel obstruction (HCC)   Non-intractable vomiting with nausea, unspecified vomiting type   History of abdominal surgery   History of hypertension   History of diabetes mellitus   Elevated lactic acid level       ED Disposition  ED Disposition     ED Disposition Condition Comment    Decision to Admit            No follow-up provider specified.       Medication List      No changes were made to your prescriptions during this visit.          Winter Coronado PA-C  10/15/21 0200       Winter Coronado PA-C  10/15/21 " 020

## 2021-10-15 NOTE — H&P
"    [facility] Medicine Services  HISTORY AND PHYSICAL    Patient Name: Marium Evans  : 1955  MRN: [id number]  Primary Care Physician: Provider, No Known  Date of admission: 10/14/2021    Subjective   Subjective     Chief Complaint:  Abdominal pain, nausea/vomiting     HPI:  Marium Evans is a 66 y.o. female with a past medical history significant for essential hypertension and diabetes mellitus type 2  Presents to the ED with complaints of abdominal pain, nausea and vomiting.  Patient reports around 6:30 yesterday evening acute onset of periumbilical, intermittent sharp/crampy pain without radiation.  In addition she reports nausea, vomiting and abdominal bloating.  She notes her last bowel movement was yesterday.  She states her bowel movement was abnormal for her due to dark colored appearance.  Patient denies any recent fever, diarrhea, chest pain, cough, shortness of air or dysuria.  She reports a history of appendectomy and hysterectomy.  She notes during her hysterectomy in  severe adhesions were observed.  Patient denies any prior history of SBO.  CT of abdomen and pelvis obtained tonight showed long segment small bowel dilatation within the central abdomen and pelvis to the right of midline with dilated segments showing considerable wall thickening and some adjacent mesenteric edema.  Appearance is concerning for closed loop obstruction with secondary bowel wall edema.  Adhesion or internal hernia should be considered.  Dr. Barboza was contacted by ED provider whom recommended keeping NPO and he is on his way to see the patient.  Patient will be admitted to [facility] under the care of the Hospitalist for further evaluation and treatment.     Attending HPI:  67 y/o female w/ prior hx of adhesiolysis reportedly w/ hx of appendectomy and hysterectomy.  Pt here w/ c/o periumbilical pain, bloating, n/v.  Notes bm was yesterday and was \"dark balls\" which is not normal for her.  " Feels better after pain meds given in ER.  No f/c.       COVID Details:    Symptoms:    [] NONE [] Fever []  Cough [] Shortness of breath [] Change in taste/smell      The patient has a COVID HM Topic on their chart, and they are fully vaccinated.      Review of Systems   Constitutional: Positive for appetite change and chills. Negative for activity change, diaphoresis, fatigue, fever and unexpected weight change.   HENT: Negative.    Eyes: Negative for photophobia and visual disturbance.   Respiratory: Negative for cough and shortness of breath.    Cardiovascular: Negative for chest pain, palpitations and leg swelling.   Gastrointestinal: Positive for abdominal distention, abdominal pain, nausea and vomiting. Negative for blood in stool, constipation and diarrhea.   Genitourinary: Negative.    Musculoskeletal: Negative.    Skin: Negative.    Neurological: Negative.    Psychiatric/Behavioral: Negative.          All other systems reviewed and are negative.     Personal History     Past Medical History:   Diagnosis Date   • Arthritis    • Atrophic vaginitis    • Diabetes mellitus (HCC)    • Herpes genitalis    • Hypertension    • Ovarian cyst    • Peritoneal adhesions    • Vaginal fibroids     uterine       Past Surgical History:   Procedure Laterality Date   • APPENDECTOMY     • BUNIONECTOMY     •  SECTION     • HYSTERECTOMY     • LYSIS OF ABDOMINAL ADHESIONS     • MOUTH SURGERY      gum surgery   • OOPHORECTOMY     • TRIGGER FINGER RELEASE     • TUBAL ABDOMINAL LIGATION         Family History: family history includes Arthritis in her father and mother; Breast cancer in her maternal aunt; Diabetes in her mother; Hypertension in her father and mother; Stroke in her maternal grandfather. Otherwise pertinent FHx was reviewed and unremarkable.     Social History:  reports that she has never smoked. She has never used smokeless tobacco. She reports that she does not drink alcohol and does not use drugs.  Social  History     Social History Narrative   • Not on file       Medications:  fluticasone, glyburide, lisinopril-hydrochlorothiazide, metFORMIN, and valACYclovir    No Known Allergies    Objective   Objective     Vital Signs:   Temp:  [97.1 °F (36.2 °C)] 97.1 °F (36.2 °C)  Heart Rate:  [71] 71  Resp:  [18] 18  BP: (182)/(84) 182/84    Physical Exam  Vitals and nursing note reviewed.   Constitutional:       General: She is not in acute distress.     Appearance: Normal appearance. She is normal weight. She is not ill-appearing, toxic-appearing or diaphoretic.   HENT:      Head: Normocephalic and atraumatic.      Nose: Nose normal.      Mouth/Throat:      Mouth: Mucous membranes are dry.      Pharynx: Oropharynx is clear.   Eyes:      Extraocular Movements: Extraocular movements intact.      Conjunctiva/sclera: Conjunctivae normal.      Pupils: Pupils are equal, round, and reactive to light.   Cardiovascular:      Rate and Rhythm: Normal rate and regular rhythm.      Pulses: Normal pulses.      Heart sounds: Normal heart sounds.   Pulmonary:      Effort: Pulmonary effort is normal.      Breath sounds: Normal breath sounds.   Abdominal:      General: Bowel sounds are normal. There is no distension.      Palpations: Abdomen is soft. There is no mass.      Tenderness: There is abdominal tenderness. There is no right CVA tenderness, left CVA tenderness, guarding or rebound.      Hernia: No hernia is present.      Comments: Increased tenderness to epigastric, periumbilical and LUQ regions    Musculoskeletal:         General: No swelling, tenderness, deformity or signs of injury. Normal range of motion.      Cervical back: Normal range of motion and neck supple.      Right lower leg: No edema.      Left lower leg: No edema.   Skin:     General: Skin is warm and dry.   Neurological:      General: No focal deficit present.      Mental Status: She is alert and oriented to person, place, and time. Mental status is at baseline.    Psychiatric:         Mood and Affect: Mood normal.         Behavior: Behavior normal.         Thought Content: Thought content normal.         Judgment: Judgment normal.        Present and assisted in above exam w/ no changes to the above.     Results Reviewed:  I have personally reviewed most recent indicated data and agree with findings including:  [x]  Laboratory  [x]  Radiology  [x]  EKG/Telemetry  []  Pathology  []  Cardiac/Vascular Studies  []  Old records  []  Other:  Most pertinent findings include:  elev anion gap  Lactate 2.6  See CT results    LAB RESULTS:      Lab 10/14/21  2223   WBC 6.66   HEMOGLOBIN 13.4   HEMATOCRIT 40.6   PLATELETS 224   NEUTROS ABS 4.35   IMMATURE GRANS (ABS) 0.03   LYMPHS ABS 1.43   MONOS ABS 0.45   EOS ABS 0.36   MCV 85.3   LACTATE 2.6*         Lab 10/14/21  2223   SODIUM 141   POTASSIUM 3.7   CHLORIDE 101   CO2 24.0   ANION GAP 16.0*   BUN 22   CREATININE 1.06*   GLUCOSE 179*   CALCIUM 10.0         Lab 10/14/21  2223   TOTAL PROTEIN 7.7   ALBUMIN 4.90   GLOBULIN 2.8   ALT (SGPT) 6   AST (SGOT) 15   BILIRUBIN 0.3   ALK PHOS 81   LIPASE 38                     Brief Urine Lab Results     None        Microbiology Results (last 10 days)     ** No results found for the last 240 hours. **          CT Abdomen Pelvis With Contrast    Result Date: 10/15/2021  CT ABDOMEN AND PELVIS WITH CONTRAST, 10/15/2021 HISTORY: 66-year-old female in the ED complaining of. Helical abdominal pain, nausea, vomiting and dark stools today. Surgical history including lysis of abdominal adhesions. No prior imaging here. TECHNIQUE: CT imaging of the abdomen and pelvis with IV contrast. Radiation dose reduction techniques included automated exposure control. Radiation audit for CT and nuclear cardiology exams in the last 12 months: 0. ABDOMEN FINDINGS: There is a cluster of moderately dilated, fluid-filled small bowel segments within the right mid abdomen and right mid and upper pelvis with some adjacent  mesenteric edema. These segments show significant diffuse wall thickening and mucosal hyperenhancement. Small bowel proximal and distal to this segment is normal in caliber. The findings are concerning for closed loop obstruction with secondary edema and inflammation of unobstructed segments. Primary inflammatory bowel disease such as Crohn's disease could appear similarly. There is no twisting of mesentery. No body wall hernia or additional etiology for obstruction is identified. The colon is normal in caliber and appearance. No gastric distention or distal esophageal dilatation. The appendix is surgically absent by history. No gallbladder distention or bile duct dilatation. Liver, pancreas, spleen and kidneys are normal in size and appearance, with the exception of a small typical benign left mid renal cyst. Normal caliber abdominal aorta with patent mesenteric arteries. PELVIS FINDINGS: Urinary bladder, uterus, adnexal regions and rectum are negative. Small amount of free pelvic fluid. No inguinal hernia. Mild scarring or atelectasis in the dependent posterior lung bases.     Impression: 1.  Long segment small bowel dilatation within the central abdomen and pelvis to the right of midline with dilated segments showing considerable wall thickening and some adjacent mesenteric edema. The appearance is concerning for closed loop obstruction with secondary bowel wall edema. Adhesion or internal hernia should be considered. Primary small bowel inflammation due to Crohn's disease could appear similarly. 2.  Remaining segments of small bowel and colon are normal in appearance. No abscess or free intraperitoneal air. Signer Name: Kuldip Tavarez MD  Signed: 10/15/2021 12:48 AM  Workstation Name: [email or web address]  [facility]          Assessment/Plan   Assessment & Plan       SBO (small bowel obstruction) (HCC)    Hypertension    Diabetes mellitus (HCC)    Lactic acidosis    Elevated serum  creatinine      66 year old female presents to the ED with periumbilical abdominal pain that began yesterday evening with associated nausea and vomiting.     1) Closed loop SBO with bowel wall edema and lactic acidosis;  **ED has d/w Dr. Mosher who states he will see in ER     ? Internal hernia      Lactic acidosis   -CT of abdomen and pelvis noted above  -General surgery to see tonight   -lactic acid 2.6: s/p 1L normal saline   -pain control   -prn anti-emetics   -continue IVF  -NPO  -reflex lactic pending     2) Elevated serum creatinine   -likely due to volume depletion   -continue IVF  -hold nephrotoxic agents    3) Diabetes mellitus type 2  -check hgb A1c   -start ldssi   -fingersticks q6    4) Essential hypertension   -on lisinopril/HCTZ: hold for now  -monitor         DVT prophylaxis: scds    CODE STATUS:  Full code        This note has been completed as part of a split-shared workflow.     Signature: Electronically signed by MILTON Henley, 10/15/21, 2:04 AM EDT.    Electronically signed by Leanna Mello MD, 10/15/21, 5:09 AM EDT.  INPATIENT status due to  SBO.  I feel patient’s risk for adverse outcomes and need for care warrant INPATIENT evaluation and I predict the patient’s care encounter to likely last beyond 2 midnights.

## 2021-10-15 NOTE — PROGRESS NOTES
"General Surgery Daily Progress Note    Subjective:  No new complaints    Objective:  /73 (BP Location: Right arm, Patient Position: Lying)   Pulse 53   Temp 98 °F (36.7 °C) (Oral)   Resp 18   Ht 162.6 cm (64\")   Wt 63.8 kg (140 lb 9.6 oz)   SpO2 98%   BMI 24.13 kg/m²       General Appearance: Mild distress  Eyes: Anicteric  Neck: Trachea midline   Cardiovascular:  RRR without murmur nor rub  Lungs:  Bilateral respirations unlabored   Abdomen:  Soft, mild tenderness without a generalized peritonitis  Extremities:  No cyanosis or edema   Skin:  No obvious rashes   Neurologic: awake and conversant     Assessment:  Enteritis with obstructive type picture    Plan:   She remains hemodynamically stable with a heart rate in the 50s.  She denies worsening or severe abdominal pain.  Her abdominal exam remains relatively benign.  White blood cell count normal without left shift.  I am going to empirically treat her with Zosyn for enteritis.    Kuldip Barboza MD - 10/15/2021, 14:50 EDT        "

## 2021-10-15 NOTE — PLAN OF CARE
Goal Outcome Evaluation:               Pt arrived to floor from ED 0325. VSS. No c/o pain, n/v. IV fluids started. Awaiting Dr. Barboza. Will continue to monitor.

## 2021-10-15 NOTE — PROGRESS NOTES
[facility] Medicine Services  PROGRESS NOTE    Patient Name: Marium Evans  : 1955  MRN: [id number]    Date of Admission: 10/14/2021  Primary Care Physician: Provider, No Known    Subjective   Subjective     CC:  Abdominal pain and vomiting    HPI:  Feels much better.  Pain has dissipated and she has no nausea.  Has not passed gas or had a BM yet.      ROS:  Gen- No fevers, chills  CV- No chest pain, palpitations  Resp- No cough, dyspnea  musc - has not been OOB yet     Objective   Objective     Vital Signs:   Temp:  [97.1 °F (36.2 °C)-98 °F (36.7 °C)] 98 °F (36.7 °C)  Heart Rate:  [60-71] 60  Resp:  [18] 18  BP: (130-182)/(64-84) 137/64     Physical Exam:  Constitutional: Awake, alert woman in bed, NAD, visitor present   Eyes: PER, sclerae anicteric, no conjunctival injection  HENT: NCAT, mucous membranes moist  Neck: Supple, trachea midline  Respiratory: Clear to auscultation bilaterally, nonlabored respirations   Cardiovascular: RRR, no murmurs,  Gastrointestinal: Positive bowel sounds - normal pitch-  soft, nontender, nondistended  Musculoskeletal: No bilateral ankle edema, no clubbing or cyanosis to extremities  Psychiatric: Appropriate affect, cooperative  Neurologic: Oriented x 3, moves all extremities, Cranial Nerves grossly intact to confrontation, speech clear  Skin: No rashes      Results Reviewed:  LAB RESULTS:      Lab 10/15/21  0144 10/14/21  2223   WBC  --  6.66   HEMOGLOBIN  --  13.4   HEMATOCRIT  --  40.6   PLATELETS  --  224   NEUTROS ABS  --  4.35   IMMATURE GRANS (ABS)  --  0.03   LYMPHS ABS  --  1.43   MONOS ABS  --  0.45   EOS ABS  --  0.36   MCV  --  85.3   LACTATE 1.1 2.6*         Lab 10/14/21  2223   SODIUM 141   POTASSIUM 3.7   CHLORIDE 101   CO2 24.0   ANION GAP 16.0*   BUN 22   CREATININE 1.06*   GLUCOSE 179*   CALCIUM 10.0         Lab 10/14/21  2223   TOTAL PROTEIN 7.7   ALBUMIN 4.90   GLOBULIN 2.8   ALT (SGPT) 6   AST (SGOT) 15   BILIRUBIN 0.3   ALK  PHOS 81   LIPASE 38                     Brief Urine Lab Results  (Last result in the past 365 days)      Color   Clarity   Blood   Leuk Est   Nitrite   Protein   CREAT   Urine HCG        10/15/21 0136 Yellow   Clear   Negative   Negative   Negative   Negative                 Microbiology Results Abnormal     Procedure Component Value - Date/Time    COVID PRE-OP / PRE-PROCEDURE SCREENING ORDER (NO ISOLATION) - Swab, Nasopharynx [[id number]]  (Normal) Collected: 10/15/21 0146    Lab Status: Final result Specimen: Swab from Nasopharynx Updated: 10/15/21 0218    Narrative:      The following orders were created for panel order COVID PRE-OP / PRE-PROCEDURE SCREENING ORDER (NO ISOLATION) - Swab, Nasopharynx.  Procedure                               Abnormality         Status                     ---------                               -----------         ------                     COVID-19, ABBOTT IN-HOUS...[[id number]]  Normal              Final result                 Please view results for these tests on the individual orders.    COVID-19, ABBOTT IN-HOUSE,NASAL Swab (NO TRANSPORT MEDIA) 2 HR TAT - Swab, Nasopharynx [[id number]]  (Normal) Collected: 10/15/21 0146    Lab Status: Final result Specimen: Swab from Nasopharynx Updated: 10/15/21 0218     COVID19 Presumptive Negative    Narrative:      Fact sheet for providers: https://www.fda.gov/media/029116/download     Fact sheet for patients: https://www.fda.gov/media/209428/download    Test performed by PCR.  If inconsistent with clinical signs and symptoms patient should be tested with different authorized molecular test.          CT Abdomen Pelvis With Contrast    Result Date: 10/15/2021  CT ABDOMEN AND PELVIS WITH CONTRAST, 10/15/2021 HISTORY: 66-year-old female in the ED complaining of. Helical abdominal pain, nausea, vomiting and dark stools today. Surgical history including lysis of abdominal adhesions. No prior imaging here. TECHNIQUE: CT imaging of the abdomen and  pelvis with IV contrast. Radiation dose reduction techniques included automated exposure control. Radiation audit for CT and nuclear cardiology exams in the last 12 months: 0. ABDOMEN FINDINGS: There is a cluster of moderately dilated, fluid-filled small bowel segments within the right mid abdomen and right mid and upper pelvis with some adjacent mesenteric edema. These segments show significant diffuse wall thickening and mucosal hyperenhancement. Small bowel proximal and distal to this segment is normal in caliber. The findings are concerning for closed loop obstruction with secondary edema and inflammation of unobstructed segments. Primary inflammatory bowel disease such as Crohn's disease could appear similarly. There is no twisting of mesentery. No body wall hernia or additional etiology for obstruction is identified. The colon is normal in caliber and appearance. No gastric distention or distal esophageal dilatation. The appendix is surgically absent by history. No gallbladder distention or bile duct dilatation. Liver, pancreas, spleen and kidneys are normal in size and appearance, with the exception of a small typical benign left mid renal cyst. Normal caliber abdominal aorta with patent mesenteric arteries. PELVIS FINDINGS: Urinary bladder, uterus, adnexal regions and rectum are negative. Small amount of free pelvic fluid. No inguinal hernia. Mild scarring or atelectasis in the dependent posterior lung bases.     Impression: 1.  Long segment small bowel dilatation within the central abdomen and pelvis to the right of midline with dilated segments showing considerable wall thickening and some adjacent mesenteric edema. The appearance is concerning for closed loop obstruction with secondary bowel wall edema. Adhesion or internal hernia should be considered. Primary small bowel inflammation due to Crohn's disease could appear similarly. 2.  Remaining segments of small bowel and colon are normal in appearance.  No abscess or free intraperitoneal air. Signer Name: Kuldip Tavarez MD  Signed: 10/15/2021 12:48 AM  Workstation Name: [email or web address]  [facility]          I have reviewed the medications:  Scheduled Meds:famotidine, 20 mg, Intravenous, Q12H  fluticasone, 1 spray, Each Nare, BID  heparin (porcine), 5,000 Units, Subcutaneous, Q8H  insulin lispro, 0-7 Units, Subcutaneous, TID AC      Continuous Infusions:lactated ringers, 125 mL/hr, Last Rate: 125 mL/hr (10/15/21 0624)      PRN Meds:.•  dextrose  •  dextrose  •  glucagon (human recombinant)  •  [DISCONTINUED] HYDROmorphone **AND** naloxone  •  ondansetron **OR** ondansetron  •  Sodium Chloride (PF)    Assessment/Plan   Assessment & Plan     Active Hospital Problems    Diagnosis  POA   • **SBO (small bowel obstruction) (HCC) [K56.609]  Yes   • Lactic acidosis [E87.2]  Yes   • Elevated serum creatinine [R79.89]  Yes   • Small bowel obstruction (HCC) [K56.609]  Yes   • Diabetes mellitus (HCC) [E11.9]  Yes   • Hypertension [I10]  Yes      Resolved Hospital Problems   No resolved problems to display.        Brief Hospital Course to date:  Marium Evans is a 66 y.o. female  presents to the ED with periumbilical abdominal pain that began yesterday evening with associated nausea and vomiting.      Closed loop SBO with bowel wall edema and lactic acidosis;  - CT of abdomen and pelvis noted   - General surgery consulted; plan is conservative care for now   - lactic acid 2.6:, resolved w fluids  -pain control   -prn anti-emetics      Diabetes mellitus type 2  -check hgb A1c   -start ldssi   -fingersticks q6     4) Essential hypertension   -on lisinopril/HCTZ: hold for now  -monitor     DVT prophylaxis:  Medical and mechanical DVT prophylaxis orders are present.       AM-PAC 6 Clicks Score (PT): 24 (10/15/21 4001)    Disposition: I expect the patient to be discharged tbd    CODE STATUS:   Code Status and Medical Interventions:   Ordered at: 10/15/21 8058      Level Of Support Discussed With:    Patient     Code Status:    CPR     Medical Interventions (Level of Support Prior to Arrest):    Full       Rachel Ovalle MD  10/15/21

## 2021-10-15 NOTE — PROGRESS NOTES
"General Surgery Daily Progress Note    Subjective:  Feeling a bit better.  Passed a bit of gas.    Objective:  /70 (BP Location: Right arm, Patient Position: Lying)   Pulse 55   Temp 98.2 °F (36.8 °C) (Oral)   Resp 18   Ht 162.6 cm (64\")   Wt 63.8 kg (140 lb 9.6 oz)   SpO2 98%   BMI 24.13 kg/m²       General Appearance: No apparent distress  Eyes: Anicteric  Neck: Trachea midline   Cardiovascular:  RRR without murmur nor rub  Lungs:  Bilateral respirations unlabored   Abdomen:  Soft, minimal tenderness, no generalized peritonitis  Extremities:  No cyanosis or edema   Skin:  No obvious rashes   Neurologic: awake and conversant     Assessment/Plan:   May have clear liquids.    Kuldip Barboza MD - 10/15/2021, 18:24 EDT        "

## 2021-10-15 NOTE — CASE MANAGEMENT/SOCIAL WORK
Discharge Planning Assessment  [facility]     Patient Name: Marium Evans  MRN: [id number]  Today's Date: 10/15/2021    Admit Date: 10/14/2021     Discharge Needs Assessment     Row Name 10/15/21 1605       Living Environment    Lives With spouse    Name(s) of Who Lives With Patient Fidencio Evans(spouse)    Current Living Arrangements home/apartment/condo    Primary Care Provided by self    Provides Primary Care For no one    Family Caregiver if Needed spouse    Quality of Family Relationships helpful; involved; supportive    Able to Return to Prior Arrangements yes    Living Arrangement Comments CM spoke with pt in room with permission regarding discharge plan. Pt resides in [facility] with spouse.       Resource/Environmental Concerns    Resource/Environmental Concerns none       Transition Planning    Patient/Family Anticipates Transition to home with family    Patient/Family Anticipated Services at Transition     Transportation Anticipated family or friend will provide       Discharge Needs Assessment    Readmission Within the Last 30 Days no previous admission in last 30 days    Equipment Currently Used at Home none    Concerns to be Addressed discharge planning    Equipment Needed After Discharge none    Discharge Coordination/Progress Pt reports she has [employer] insurance with prescription coverage. Pt uses [facility] at [facility]. Pt came into the hospital for small bowel obstruction. Pt is independent of ADLs. Plan is home with spouse at discharge. Pt denies discharge needs at this time. Pt reports she does not have a POA or living will. CM will cont to follow.               Discharge Plan     Row Name 10/15/21 1604       Plan    Plan discharge plan    Plan Comments Plan is home with spouse at discharge. Pt denies discharge needs at this time. Spouse is helpful. Pt is independent of ADLS. CM will cont to follow.    Final Discharge Disposition Code 01 - home or self-care               Continued Care and Services - Admitted Since 10/14/2021    Coordination has not been started for this encounter.       Expected Discharge Date and Time     Expected Discharge Date Expected Discharge Time    Oct 18, 2021          Demographic Summary     Row Name 10/15/21 1605       General Information    General Information Comments Pt reports her PCP is [facility]       Contact Information    Permission Granted to Share Info With     Contact Information Obtained for                Functional Status    No documentation.                Psychosocial    No documentation.                Abuse/Neglect    No documentation.                Legal    No documentation.                Substance Abuse    No documentation.                Patient Forms    No documentation.                   Ernestine Raines RN

## 2021-10-15 NOTE — CONSULTS
"General Surgery Consultation Note    Date of Service: 10/15/2021  Marium Evans  [id number]  1955      Referring Provider: Leanna Mello MD    Location of Consult: ER     Reason for Consultation: Small bowel obstruction       History of Present Illness:  I am seeing, Marium Evans, in consultation for Leanna Mello MD regarding abdominal pain and small bowel obstruction.  66-year-old lady with past medical history significant for diabetes mellitus and hypertension presented with the acute onset of colicky abdominal pain.  Her pain was in the periumbilical/lower abdomen.  In maximal intensity this was approximately 8 out of 10.  This was associated with nausea and vomiting.  Her vomitus was \"light\".  Her last bowel movement/flatus was at approximately 730 last evening.  Currently she denies pain, nausea, or vomiting.  Otherwise there are no significant modifying factors or associated symptoms.    Problems Addressed this Visit        Gastrointestinal Abdominal     Small bowel obstruction (HCC) - Primary      Other Visit Diagnoses     Non-intractable vomiting with nausea, unspecified vomiting type        History of abdominal surgery        Multiple abdominal surgeries, including surgery for adhesions    History of hypertension        History of diabetes mellitus        Elevated lactic acid level          Diagnoses       Codes Comments    Small bowel obstruction (HCC)    -  Primary ICD-10-CM: K56.609  ICD-9-CM: 560.9 Closed loop; mid-abdomen    Non-intractable vomiting with nausea, unspecified vomiting type     ICD-10-CM: R11.2  ICD-9-CM: 787.01     History of abdominal surgery     ICD-10-CM: Z98.890  ICD-9-CM: V45.89 Multiple abdominal surgeries, including surgery for adhesions    History of hypertension     ICD-10-CM: Z86.79  ICD-9-CM: V12.59     History of diabetes mellitus     ICD-10-CM: Z86.39  ICD-9-CM: V12.29     Elevated lactic acid level     ICD-10-CM: R79.89  ICD-9-CM: 276.2           Past Medical " History:   Diagnosis Date   • Arthritis    • Atrophic vaginitis    • Diabetes mellitus (HCC)    • Herpes genitalis    • Hypertension    • Ovarian cyst    • Peritoneal adhesions    • Vaginal fibroids     uterine       Past Surgical History:   • APPENDECTOMY   • BUNIONECTOMY   •  SECTION   • HYSTERECTOMY   • LYSIS OF ABDOMINAL ADHESIONS   • MOUTH SURGERY    gum surgery   • OOPHORECTOMY   • TRIGGER FINGER RELEASE   • TUBAL ABDOMINAL LIGATION       No Known Allergies    No current facility-administered medications on file prior to encounter.     Current Outpatient Medications on File Prior to Encounter   Medication Sig Dispense Refill   • metaxalone (SKELAXIN) 800 MG tablet Take 800 mg by mouth 3 (Three) Times a Day As Needed for Muscle Spasms.     • fluticasone (FLONASE) 50 MCG/ACT nasal spray fluticasone propionate 50 mcg/actuation nasal spray,suspension   INSTILL 2 SPRAYS IN EACH NOSTRIL DAILY     • glyburide (DIAbeta) 5 MG tablet Take 5 mg by mouth 2 (Two) Times a Day.     • lisinopril-hydrochlorothiazide (PRINZIDE,ZESTORETIC) 20-25 MG per tablet Take 1 tablet by mouth 2 (Two) Times a Day.     • metFORMIN (GLUCOPHAGE) 500 MG tablet TAKE 2 TABLETS BY MOUTH EVERY MORNING AND EVERY EVENING     • valACYclovir (Valtrex) 500 MG tablet Valtrex 500 mg tablet   1 bid           Current Facility-Administered Medications:   •  dextrose (D50W) (25 g/50 mL) IV injection 25 g, 25 g, Intravenous, Q15 Min PRN, Kathy, Jyotsna, APRN  •  dextrose (GLUTOSE) oral gel 15 g, 15 g, Oral, Q15 Min PRN, Kathy, Jyotsna, APRN  •  fluticasone (FLONASE) 50 MCG/ACT nasal spray 1 spray, 1 spray, Each Nare, BID, Kathy, Jyotsna, APRN  •  glucagon (human recombinant) (GLUCAGEN DIAGNOSTIC) injection 1 mg, 1 mg, Subcutaneous, Q15 Min PRN, Kathy, Jyotsna, APRN  •  HYDROmorphone (DILAUDID) injection 0.5 mg, 0.5 mg, Intravenous, Q3H PRN **AND** naloxone (NARCAN) injection 0.4 mg, 0.4 mg, Intravenous, Q5 Min PRN, Kathy, Jyotsna, APRN  •   insulin lispro (humaLOG) injection 0-7 Units, 0-7 Units, Subcutaneous, TID AC, Kathy, Jyotsna, APRN  •  lactated ringers infusion, 100 mL/hr, Intravenous, Continuous, Kathy, Jyotsna, APRN, Last Rate: 100 mL/hr at 10/15/21 0341, 100 mL/hr at 10/15/21 0341  •  ondansetron (ZOFRAN) tablet 4 mg, 4 mg, Oral, Q6H PRN **OR** ondansetron (ZOFRAN) injection 4 mg, 4 mg, Intravenous, Q6H PRN, Kathy, Jyotsna, APRN  •  Sodium Chloride (PF) 0.9 % 10 mL, 10 mL, Intravenous, PRN, Colten Long DO    Family History   Problem Relation Age of Onset   • Breast cancer Maternal Aunt    • Arthritis Mother    • Diabetes Mother    • Hypertension Mother    • Arthritis Father    • Hypertension Father    • Stroke Maternal Grandfather    • Ovarian cancer Neg Hx      Social History     Socioeconomic History   • Marital status:    Tobacco Use   • Smoking status: Never Smoker   • Smokeless tobacco: Never Used   Substance and Sexual Activity   • Alcohol use: Never   • Drug use: Never   • Sexual activity: Never       Review of Systems:  Review of Systems   Constitutional: Negative for chills, fatigue and fever.   HENT: Negative for dental problem, ear discharge and facial swelling.    Eyes: Negative for photophobia and visual disturbance.   Respiratory: Negative for cough, chest tightness and shortness of breath.    Cardiovascular: Negative for chest pain and leg swelling.   Gastrointestinal: Positive for abdominal pain, nausea and vomiting.   Endocrine: Negative for polyphagia and polyuria.   Genitourinary: Negative for difficulty urinating, dysuria and hematuria.   Musculoskeletal: Negative for arthralgias and gait problem.   Skin: Negative for pallor and rash.   Allergic/Immunologic: Negative for immunocompromised state.   Neurological: Negative for facial asymmetry and numbness.   Hematological: Negative for adenopathy.   Psychiatric/Behavioral: Negative for confusion, hallucinations and self-injury.     Otherwise the 12  "point review of systems is negative.    /64   Pulse 60   Temp 98 °F (36.7 °C) (Oral)   Resp 18   Ht 162.6 cm (64\")   Wt 63.8 kg (140 lb 9.6 oz)   SpO2 99%   BMI 24.13 kg/m²   Body mass index is 24.13 kg/m².    General: Pleasantly conversant and no apparent distress  HEENT: PER, no icterus, normal sclerae  Cardiac: regular rhythm,  no audible rubs  Pulmonary: bilateral breath sounds, non labored  Abdominal: Low abdominal scar, no generalized peritonitis, no palpable hepatosplenomegaly  Neurologic: awake, alert, no obvious focal deficits  Extremities: warm, no edema  Skin: no obvious rashes nor worrisome lesions seen     CBC  Results from last 7 days   Lab Units 10/14/21  2223   WBC 10*3/mm3 6.66   HEMOGLOBIN g/dL 13.4   HEMATOCRIT % 40.6   PLATELETS 10*3/mm3 224       CMP  Results from last 7 days   Lab Units 10/14/21  2223   SODIUM mmol/L 141   POTASSIUM mmol/L 3.7   CHLORIDE mmol/L 101   CO2 mmol/L 24.0   BUN mg/dL 22   CREATININE mg/dL 1.06*   CALCIUM mg/dL 10.0   BILIRUBIN mg/dL 0.3   ALK PHOS U/L 81   ALT (SGPT) U/L 6   AST (SGOT) U/L 15   GLUCOSE mg/dL 179*       Radiology  Imaging Results (Last 72 Hours)     Procedure Component Value Units Date/Time    CT Abdomen Pelvis With Contrast [[id number]] Collected: 10/15/21 0048     Updated: 10/15/21 0050    Narrative:      CT ABDOMEN AND PELVIS WITH CONTRAST, 10/15/2021    HISTORY:  66-year-old female in the ED complaining of. Helical abdominal pain, nausea, vomiting and dark stools today. Surgical history including lysis of abdominal adhesions. No prior imaging here.    TECHNIQUE:  CT imaging of the abdomen and pelvis with IV contrast. Radiation dose reduction techniques included automated exposure control. Radiation audit for CT and nuclear cardiology exams in the last 12 months: 0.    ABDOMEN FINDINGS:  There is a cluster of moderately dilated, fluid-filled small bowel segments within the right mid abdomen and right mid and upper pelvis with some " adjacent mesenteric edema. These segments show significant diffuse wall thickening and mucosal  hyperenhancement. Small bowel proximal and distal to this segment is normal in caliber. The findings are concerning for closed loop obstruction with secondary edema and inflammation of unobstructed segments. Primary inflammatory bowel disease such as  Crohn's disease could appear similarly. There is no twisting of mesentery. No body wall hernia or additional etiology for obstruction is identified.    The colon is normal in caliber and appearance. No gastric distention or distal esophageal dilatation. The appendix is surgically absent by history.    No gallbladder distention or bile duct dilatation. Liver, pancreas, spleen and kidneys are normal in size and appearance, with the exception of a small typical benign left mid renal cyst. Normal caliber abdominal aorta with patent mesenteric arteries.    PELVIS FINDINGS:  Urinary bladder, uterus, adnexal regions and rectum are negative. Small amount of free pelvic fluid. No inguinal hernia.    Mild scarring or atelectasis in the dependent posterior lung bases.      Impression:      1.  Long segment small bowel dilatation within the central abdomen and pelvis to the right of midline with dilated segments showing considerable wall thickening and some adjacent mesenteric edema. The appearance is concerning for closed loop obstruction  with secondary bowel wall edema. Adhesion or internal hernia should be considered. Primary small bowel inflammation due to Crohn's disease could appear similarly.  2.  Remaining segments of small bowel and colon are normal in appearance. No abscess or free intraperitoneal air.    Signer Name: Kuldip Tavarez MD   Signed: 10/15/2021 12:48 AM   Workstation Name: [email or web address]    [facility]            Assessment:  Enteritis  Acute kidney injury  Diabetes mellitus  Hypertension    Plan:  I reviewed the patient's laboratory  examinations and CT imaging personally.  Currently she is without significant abdominal complaints.  Her vitals are stable with a heart rate in the 50s.  I awakened her from sleep to examine her and speak with her.  Reasonably benign abdominal examination.  Normal white blood cell count with normalization of her lactic acid.  At this point I am going to treat her conservatively with serial abdominal examinations and volume resuscitation.  I discussed with her hernia and possible complications including but not limited to strangulation of bowel.  All her questions were answered and she understands.    Kuldip Barboza MD  10/15/21  05:36 EDT

## 2021-10-16 LAB
ANION GAP SERPL CALCULATED.3IONS-SCNC: 9 MMOL/L (ref 5–15)
BUN SERPL-MCNC: 13 MG/DL (ref 8–23)
BUN/CREAT SERPL: 13.1 (ref 7–25)
CALCIUM SPEC-SCNC: 8.8 MG/DL (ref 8.6–10.5)
CHLORIDE SERPL-SCNC: 105 MMOL/L (ref 98–107)
CO2 SERPL-SCNC: 24 MMOL/L (ref 22–29)
CREAT SERPL-MCNC: 0.99 MG/DL (ref 0.57–1)
DEPRECATED RDW RBC AUTO: 41.5 FL (ref 37–54)
ERYTHROCYTE [DISTWIDTH] IN BLOOD BY AUTOMATED COUNT: 12.9 % (ref 12.3–15.4)
GFR SERPL CREATININE-BSD FRML MDRD: 68 ML/MIN/1.73
GLUCOSE BLDC GLUCOMTR-MCNC: 207 MG/DL (ref 70–130)
GLUCOSE BLDC GLUCOMTR-MCNC: 223 MG/DL (ref 70–130)
GLUCOSE BLDC GLUCOMTR-MCNC: 66 MG/DL (ref 70–130)
GLUCOSE BLDC GLUCOMTR-MCNC: 99 MG/DL (ref 70–130)
GLUCOSE SERPL-MCNC: 101 MG/DL (ref 65–99)
HCT VFR BLD AUTO: 37.4 % (ref 34–46.6)
HGB BLD-MCNC: 11.9 G/DL (ref 12–15.9)
MCH RBC QN AUTO: 28.2 PG (ref 26.6–33)
MCHC RBC AUTO-ENTMCNC: 31.8 G/DL (ref 31.5–35.7)
MCV RBC AUTO: 88.6 FL (ref 79–97)
PLATELET # BLD AUTO: 167 10*3/MM3 (ref 140–450)
PMV BLD AUTO: 11.9 FL (ref 6–12)
POTASSIUM SERPL-SCNC: 3.6 MMOL/L (ref 3.5–5.2)
RBC # BLD AUTO: 4.22 10*6/MM3 (ref 3.77–5.28)
SODIUM SERPL-SCNC: 138 MMOL/L (ref 136–145)
WBC # BLD AUTO: 3.19 10*3/MM3 (ref 3.4–10.8)

## 2021-10-16 PROCEDURE — 63710000001 INSULIN LISPRO (HUMAN) PER 5 UNITS: Performed by: NURSE PRACTITIONER

## 2021-10-16 PROCEDURE — 25010000002 PIPERACILLIN SOD-TAZOBACTAM PER 1 G: Performed by: SURGERY

## 2021-10-16 PROCEDURE — 99232 SBSQ HOSP IP/OBS MODERATE 35: CPT | Performed by: NURSE PRACTITIONER

## 2021-10-16 PROCEDURE — 82962 GLUCOSE BLOOD TEST: CPT

## 2021-10-16 PROCEDURE — 25010000002 HEPARIN (PORCINE) PER 1000 UNITS: Performed by: SURGERY

## 2021-10-16 PROCEDURE — 80048 BASIC METABOLIC PNL TOTAL CA: CPT | Performed by: INTERNAL MEDICINE

## 2021-10-16 PROCEDURE — 85027 COMPLETE CBC AUTOMATED: CPT | Performed by: INTERNAL MEDICINE

## 2021-10-16 RX ORDER — DEXTROSE MONOHYDRATE 25 G/50ML
25 INJECTION, SOLUTION INTRAVENOUS
Status: DISCONTINUED | OUTPATIENT
Start: 2021-10-16 | End: 2021-10-17 | Stop reason: HOSPADM

## 2021-10-16 RX ORDER — NICOTINE POLACRILEX 4 MG
15 LOZENGE BUCCAL
Status: DISCONTINUED | OUTPATIENT
Start: 2021-10-16 | End: 2021-10-17 | Stop reason: HOSPADM

## 2021-10-16 RX ORDER — LISINOPRIL 20 MG/1
20 TABLET ORAL EVERY 12 HOURS SCHEDULED
Status: DISCONTINUED | OUTPATIENT
Start: 2021-10-16 | End: 2021-10-17 | Stop reason: HOSPADM

## 2021-10-16 RX ORDER — CETIRIZINE HYDROCHLORIDE 10 MG/1
10 TABLET ORAL DAILY
Status: DISCONTINUED | OUTPATIENT
Start: 2021-10-16 | End: 2021-10-17 | Stop reason: HOSPADM

## 2021-10-16 RX ADMIN — ACETAMINOPHEN 650 MG: 325 TABLET, FILM COATED ORAL at 00:36

## 2021-10-16 RX ADMIN — FAMOTIDINE 20 MG: 10 INJECTION, SOLUTION INTRAVENOUS at 08:06

## 2021-10-16 RX ADMIN — HEPARIN SODIUM 5000 UNITS: 5000 INJECTION, SOLUTION INTRAVENOUS; SUBCUTANEOUS at 21:54

## 2021-10-16 RX ADMIN — FAMOTIDINE 20 MG: 10 INJECTION, SOLUTION INTRAVENOUS at 21:55

## 2021-10-16 RX ADMIN — LISINOPRIL 20 MG: 20 TABLET ORAL at 21:55

## 2021-10-16 RX ADMIN — CETIRIZINE HYDROCHLORIDE 10 MG: 10 TABLET, FILM COATED ORAL at 13:28

## 2021-10-16 RX ADMIN — HEPARIN SODIUM 5000 UNITS: 5000 INJECTION, SOLUTION INTRAVENOUS; SUBCUTANEOUS at 13:29

## 2021-10-16 RX ADMIN — TAZOBACTAM SODIUM AND PIPERACILLIN SODIUM 3.38 G: 375; 3 INJECTION, SOLUTION INTRAVENOUS at 21:57

## 2021-10-16 RX ADMIN — TAZOBACTAM SODIUM AND PIPERACILLIN SODIUM 3.38 G: 375; 3 INJECTION, SOLUTION INTRAVENOUS at 04:54

## 2021-10-16 RX ADMIN — INSULIN LISPRO 3 UNITS: 100 INJECTION, SOLUTION INTRAVENOUS; SUBCUTANEOUS at 13:29

## 2021-10-16 RX ADMIN — FLUTICASONE PROPIONATE 1 SPRAY: 50 SPRAY, METERED NASAL at 08:11

## 2021-10-16 RX ADMIN — TAZOBACTAM SODIUM AND PIPERACILLIN SODIUM 3.38 G: 375; 3 INJECTION, SOLUTION INTRAVENOUS at 13:30

## 2021-10-16 RX ADMIN — SODIUM CHLORIDE 100 ML/HR: 4.5 INJECTION, SOLUTION INTRAVENOUS at 21:57

## 2021-10-16 RX ADMIN — HEPARIN SODIUM 5000 UNITS: 5000 INJECTION, SOLUTION INTRAVENOUS; SUBCUTANEOUS at 04:55

## 2021-10-16 RX ADMIN — LISINOPRIL 20 MG: 20 TABLET ORAL at 10:16

## 2021-10-16 NOTE — PROGRESS NOTES
[facility] Medicine Services  PROGRESS NOTE    Patient Name: Marium Evans  : 1955  MRN: [id number]    Date of Admission: 10/14/2021  Primary Care Physician: Provider, No Known    Subjective   Subjective     CC:  Abdominal pain and vomiting    HPI:    Patient sitting out of bed, alert and orient x3, pleasant, talkative. Patient states abdominal pain has improved and denies nausea/vomiting overnight. She reports no bowel movement but is passing gas. She reports a headache related to allergies, sinus congestion. Requesting antihistamine. Blood pressure elevated today. Will add back patient's home BP medications. She is tolerating liquid diet well.    ROS:  Gen- No fevers, chills  ENT- (+) sinus congestion  CV- No chest pain, palpitations  Resp- No cough, dyspnea  GI- No N/V/D, abd pain  Neuro- (+) headache she states is from allergies.   All systems reviewed and found negative except what is indicated in the HPI/ROS      Objective   Objective     Vital Signs:   Temp:  [97.9 °F (36.6 °C)-98.3 °F (36.8 °C)] 97.9 °F (36.6 °C)  Heart Rate:  [52-67] 52  Resp:  [16-18] 16  BP: (125-171)/(65-87) 171/87     Physical Exam:  Constitutional: Awake, alert and oriented x3, very pleasant, conversant.  Eyes: PERRLA, sclerae anicteric, no conjunctival injection  HENT: NCAT, mucous membranes moist  Neck: Supple, no thyromegaly, no lymphadenopathy, trachea midline  Respiratory: Clear to auscultation bilaterally, nonlabored respirations   Cardiovascular: RRR, no murmurs, rubs, or gallops, palpable pedal pulses bilaterally  Gastrointestinal: Positive bowel sounds, soft, slightly distended with mild epigastric abdominal pain with palpation.   Musculoskeletal: No bilateral ankle edema, no clubbing or cyanosis to extremities  Psychiatric: Appropriate affect, cooperative  Neurologic: strength symmetric in all extremities, Cranial Nerves grossly intact to confrontation, speech clear  Skin: No  erinn      Results Reviewed:  LAB RESULTS:      Lab 10/16/21  0442 10/15/21  0934 10/15/21  0144 10/14/21  2223   WBC 3.19* 5.84  --  6.66   HEMOGLOBIN 11.9* 12.3  --  13.4   HEMATOCRIT 37.4 37.1  --  40.6   PLATELETS 167 161  --  224   NEUTROS ABS  --  3.56  --  4.35   IMMATURE GRANS (ABS)  --   --   --  0.03   LYMPHS ABS  --   --   --  1.43   MONOS ABS  --   --   --  0.45   EOS ABS  --  0.29  --  0.36   MCV 88.6 85.3  --  85.3   LACTATE  --  2.0 1.1 2.6*         Lab 10/16/21  0442 10/15/21  0934 10/14/21  2223   SODIUM 138 139 141   POTASSIUM 3.6 4.0 3.7   CHLORIDE 105 104 101   CO2 24.0 23.0 24.0   ANION GAP 9.0 12.0 16.0*   BUN 13 16 22   CREATININE 0.99 0.92 1.06*   GLUCOSE 101* 139* 179*   CALCIUM 8.8 9.1 10.0   HEMOGLOBIN A1C  --  8.50*  --          Lab 10/15/21  0934 10/14/21  2223   TOTAL PROTEIN 6.4 7.7   ALBUMIN 4.00 4.90   GLOBULIN 2.4 2.8   ALT (SGPT) 5 6   AST (SGOT) 12 15   BILIRUBIN 0.4 0.3   ALK PHOS 65 81   LIPASE  --  38                     Brief Urine Lab Results  (Last result in the past 365 days)      Color   Clarity   Blood   Leuk Est   Nitrite   Protein   CREAT   Urine HCG        10/15/21 0136 Yellow   Clear   Negative   Negative   Negative   Negative                 Microbiology Results Abnormal     Procedure Component Value - Date/Time    COVID PRE-OP / PRE-PROCEDURE SCREENING ORDER (NO ISOLATION) - Swab, Nasopharynx [[id number]]  (Normal) Collected: 10/15/21 0146    Lab Status: Final result Specimen: Swab from Nasopharynx Updated: 10/15/21 0218    Narrative:      The following orders were created for panel order COVID PRE-OP / PRE-PROCEDURE SCREENING ORDER (NO ISOLATION) - Swab, Nasopharynx.  Procedure                               Abnormality         Status                     ---------                               -----------         ------                     COVID-19, ABBOTT IN-HOUS...[[id number]]  Normal              Final result                 Please view results for these tests on  the individual orders.    COVID-19, ABBOTT IN-HOUSE,NASAL Swab (NO TRANSPORT MEDIA) 2 HR TAT - Swab, Nasopharynx [[id number]]  (Normal) Collected: 10/15/21 0146    Lab Status: Final result Specimen: Swab from Nasopharynx Updated: 10/15/21 0218     COVID19 Presumptive Negative    Narrative:      Fact sheet for providers: https://www.fda.gov/media/212403/download     Fact sheet for patients: https://www.fda.gov/media/296569/download    Test performed by PCR.  If inconsistent with clinical signs and symptoms patient should be tested with different authorized molecular test.          CT Abdomen Pelvis With Contrast    Result Date: 10/15/2021  CT ABDOMEN AND PELVIS WITH CONTRAST, 10/15/2021 HISTORY: 66-year-old female in the ED complaining of. Helical abdominal pain, nausea, vomiting and dark stools today. Surgical history including lysis of abdominal adhesions. No prior imaging here. TECHNIQUE: CT imaging of the abdomen and pelvis with IV contrast. Radiation dose reduction techniques included automated exposure control. Radiation audit for CT and nuclear cardiology exams in the last 12 months: 0. ABDOMEN FINDINGS: There is a cluster of moderately dilated, fluid-filled small bowel segments within the right mid abdomen and right mid and upper pelvis with some adjacent mesenteric edema. These segments show significant diffuse wall thickening and mucosal hyperenhancement. Small bowel proximal and distal to this segment is normal in caliber. The findings are concerning for closed loop obstruction with secondary edema and inflammation of unobstructed segments. Primary inflammatory bowel disease such as Crohn's disease could appear similarly. There is no twisting of mesentery. No body wall hernia or additional etiology for obstruction is identified. The colon is normal in caliber and appearance. No gastric distention or distal esophageal dilatation. The appendix is surgically absent by history. No gallbladder distention or bile  duct dilatation. Liver, pancreas, spleen and kidneys are normal in size and appearance, with the exception of a small typical benign left mid renal cyst. Normal caliber abdominal aorta with patent mesenteric arteries. PELVIS FINDINGS: Urinary bladder, uterus, adnexal regions and rectum are negative. Small amount of free pelvic fluid. No inguinal hernia. Mild scarring or atelectasis in the dependent posterior lung bases.     Impression: 1.  Long segment small bowel dilatation within the central abdomen and pelvis to the right of midline with dilated segments showing considerable wall thickening and some adjacent mesenteric edema. The appearance is concerning for closed loop obstruction with secondary bowel wall edema. Adhesion or internal hernia should be considered. Primary small bowel inflammation due to Crohn's disease could appear similarly. 2.  Remaining segments of small bowel and colon are normal in appearance. No abscess or free intraperitoneal air. Signer Name: Kuldip Tavarez MD  Signed: 10/15/2021 12:48 AM  Workstation Name: [email or web address]  [facility]          I have reviewed the medications:  Scheduled Meds:famotidine, 20 mg, Intravenous, Q12H  fluticasone, 1 spray, Each Nare, BID  heparin (porcine), 5,000 Units, Subcutaneous, Q8H  insulin lispro, 0-7 Units, Subcutaneous, TID AC  lisinopril, 20 mg, Oral, Q12H  piperacillin-tazobactam, 3.375 g, Intravenous, Q8H      Continuous Infusions:sodium chloride, 100 mL/hr, Last Rate: 100 mL/hr (10/15/21 9026)      PRN Meds:.•  acetaminophen  •  dextrose  •  dextrose  •  glucagon (human recombinant)  •  [DISCONTINUED] HYDROmorphone **AND** naloxone  •  ondansetron **OR** ondansetron  •  Sodium Chloride (PF)    Assessment/Plan   Assessment & Plan     Active Hospital Problems    Diagnosis  POA   • **SBO (small bowel obstruction) ([facility]) [K56.609]  Yes   • Lactic acidosis [E87.2]  Yes   • Elevated serum creatinine [R79.89]  Yes   • Small bowel  obstruction (HCC) [K56.609]  Yes   • Diabetes mellitus (HCC) [E11.9]  Yes   • Hypertension [I10]  Yes      Resolved Hospital Problems   No resolved problems to display.     Brief Hospital Course to date:  Marium Evans is a 66 y.o. female  presents to the ED with periumbilical abdominal pain that began yesterday evening with associated nausea and vomiting.      Closed loop SBO with bowel wall edema and lactic acidosis;  - CT of abdomen and pelvis noted   - General surgery consulted; plan is conservative care for now   - lactic acid 2.6:, resolved w fluids  - pain control   - prn anti-emetics   - 10/15 empirically treated with Zosyn for enteritis     Diabetes mellitus type 2  - A1c 8.5  - start ldssi      4) Essential hypertension   - 10/16 BP elevated today. Start back lisinopril 20 mg bid    5) seasonal allergies  - zyrtec, flonase    DVT prophylaxis:  Medical and mechanical DVT prophylaxis orders are present.       AM-PAC 6 Clicks Score (PT): 24 (10/15/21 2009)    Disposition: I expect the patient to be discharged tbd    CODE STATUS:   Code Status and Medical Interventions:   Ordered at: 10/15/21 0208     Level Of Support Discussed With:    Patient     Code Status:    CPR     Medical Interventions (Level of Support Prior to Arrest):    Full       Buffy Riley, APRN  10/16/21

## 2021-10-16 NOTE — PLAN OF CARE
Inpatient RN   Plan of Care Update  ________________________________________________    Patient Name:  Marium Evans  YOB: 1955  MRN: [id number]  Admit Date:  10/14/2021      Assessment Date:  10/16/2021    Vital Signs stable, On Telemetry, Pt is Sinus Bradycardia, Pt currently on room air Pt has been sleeping well with a Pain status of well controlled and finding no nausea and no vomiting.    Pt orientated to person, place, time and situation with LOC of alert.    UOP adequate. No BM. Passing flatus. Bowel sounds hypoactive but audible in all quadrants. Tolerating clear liquids well.        Pt has no requests at this time.         Electronically signed by:  GLENNA KONG RN  10/16/21 03:08 EDT        Goal Outcome Evaluation:  Plan of Care Reviewed With: patient        Progress: improving      Problem: Adult Inpatient Plan of Care  Goal: Plan of Care Review  Outcome: Ongoing, Progressing  Flowsheets (Taken 10/16/2021 0308)  Progress: improving  Plan of Care Reviewed With: patient  Goal: Patient-Specific Goal (Individualized)  Outcome: Ongoing, Progressing  Goal: Absence of Hospital-Acquired Illness or Injury  Outcome: Ongoing, Progressing  Intervention: Identify and Manage Fall Risk  Recent Flowsheet Documentation  Taken 10/16/2021 0200 by Glenna Kong, RN  Safety Promotion/Fall Prevention:   activity supervised   assistive device/personal items within reach   clutter free environment maintained   fall prevention program maintained   lighting adjusted   nonskid shoes/slippers when out of bed   room organization consistent   safety round/check completed  Taken 10/16/2021 0036 by Glenna Kong, RN  Safety Promotion/Fall Prevention:   activity supervised   assistive device/personal items within reach   clutter free environment maintained   fall prevention program maintained   lighting adjusted   nonskid shoes/slippers when out of bed   room organization consistent   safety round/check  completed  Taken 10/15/2021 2200 by Glenna Chiang RN  Safety Promotion/Fall Prevention:   activity supervised   assistive device/personal items within reach   clutter free environment maintained   fall prevention program maintained   lighting adjusted   nonskid shoes/slippers when out of bed   room organization consistent   safety round/check completed  Taken 10/15/2021 2009 by Glenna Chiang RN  Safety Promotion/Fall Prevention:   assistive device/personal items within reach   activity supervised   clutter free environment maintained   fall prevention program maintained   lighting adjusted   nonskid shoes/slippers when out of bed   room organization consistent   safety round/check completed  Intervention: Prevent Skin Injury  Recent Flowsheet Documentation  Taken 10/15/2021 2009 by Glenna Chiang RN  Body Position: position changed independently  Taken 10/15/2021 2000 by Glenna Chiang RN  Skin Protection:   adhesive use limited   incontinence pads utilized   tubing/devices free from skin contact  Intervention: Prevent and Manage VTE (venous thromboembolism) Risk  Recent Flowsheet Documentation  Taken 10/15/2021 2000 by Glenna Chiang RN  VTE Prevention/Management:   bilateral   dorsiflexion/plantar flexion performed  Intervention: Prevent Infection  Recent Flowsheet Documentation  Taken 10/15/2021 2000 by Glenna Chiang RN  Infection Prevention:   environmental surveillance performed   hand hygiene promoted   rest/sleep promoted   visitors restricted/screened   single patient room provided  Goal: Optimal Comfort and Wellbeing  Outcome: Ongoing, Progressing  Intervention: Provide Person-Centered Care  Recent Flowsheet Documentation  Taken 10/15/2021 2000 by Glenna Chiang RN  Trust Relationship/Rapport:   care explained   choices provided   emotional support provided  Goal: Readiness for Transition of Care  Outcome: Ongoing, Progressing     Problem: Pain Acute  Goal: Optimal Pain  Control  Outcome: Ongoing, Progressing  Intervention: Optimize Psychosocial Wellbeing  Recent Flowsheet Documentation  Taken 10/15/2021 2000 by Glenna Chiang, RN  Diversional Activities:   television   smartphone

## 2021-10-16 NOTE — PROGRESS NOTES
"General Surgery Daily Progress Note    Subjective:  Passing flatus with less abdominal pain.    Objective:  /81 (BP Location: Right arm, Patient Position: Lying)   Pulse 59   Temp 97.6 °F (36.4 °C) (Oral)   Resp 16   Ht 162.6 cm (64\")   Wt 66.6 kg (146 lb 14.4 oz)   SpO2 99%   BMI 25.22 kg/m²       General Appearance: No apparent distress  Eyes: Anicteric  Neck: Trachea midline   Cardiovascular:  RRR without murmur nor rub  Lungs:  Bilateral respirations unlabored   Abdomen:  Soft, non-tender, no masses, no organomegaly   Extremities:  No cyanosis or edema   Skin:  No obvious rashes   Neurologic: awake and conversant       Imaging Results (Last 24 Hours)     ** No results found for the last 24 hours. **          CBC:  Results from last 7 days   Lab Units 10/16/21  0442   WBC 10*3/mm3 3.19*   HEMOGLOBIN g/dL 11.9*   HEMATOCRIT % 37.4   PLATELETS 10*3/mm3 167       CMP:  Results from last 7 days   Lab Units 10/16/21  0442 10/15/21  0934 10/15/21  0934   SODIUM mmol/L 138   < > 139   POTASSIUM mmol/L 3.6   < > 4.0   CHLORIDE mmol/L 105   < > 104   CO2 mmol/L 24.0   < > 23.0   BUN mg/dL 13   < > 16   CREATININE mg/dL 0.99   < > 0.92   CALCIUM mg/dL 8.8   < > 9.1   BILIRUBIN mg/dL  --   --  0.4   ALK PHOS U/L  --   --  65   ALT (SGPT) U/L  --   --  5   AST (SGOT) U/L  --   --  12   GLUCOSE mg/dL 101*   < > 139*    < > = values in this interval not displayed.         Assessment:  Enteritis with small bowel obstruction improving    Plan:   I will advance to a low residue diet.    Kuldip Barboza MD - 10/16/2021, 16:12 EDT        "

## 2021-10-17 ENCOUNTER — APPOINTMENT (OUTPATIENT)
Dept: GENERAL RADIOLOGY | Facility: HOSPITAL | Age: 66
End: 2021-10-17

## 2021-10-17 VITALS
SYSTOLIC BLOOD PRESSURE: 169 MMHG | HEIGHT: 64 IN | DIASTOLIC BLOOD PRESSURE: 80 MMHG | HEART RATE: 45 BPM | RESPIRATION RATE: 16 BRPM | TEMPERATURE: 97.7 F | BODY MASS INDEX: 26.1 KG/M2 | OXYGEN SATURATION: 99 % | WEIGHT: 152.9 LBS

## 2021-10-17 PROBLEM — E87.20 LACTIC ACIDOSIS: Status: RESOLVED | Noted: 2021-10-15 | Resolved: 2021-10-17

## 2021-10-17 PROBLEM — R79.89 ELEVATED SERUM CREATININE: Status: RESOLVED | Noted: 2021-10-15 | Resolved: 2021-10-17

## 2021-10-17 PROBLEM — K56.609 SMALL BOWEL OBSTRUCTION (HCC): Status: RESOLVED | Noted: 2021-10-15 | Resolved: 2021-10-17

## 2021-10-17 PROBLEM — K56.609 SBO (SMALL BOWEL OBSTRUCTION) (HCC): Status: RESOLVED | Noted: 2021-10-15 | Resolved: 2021-10-17

## 2021-10-17 LAB
ANION GAP SERPL CALCULATED.3IONS-SCNC: 10 MMOL/L (ref 5–15)
BASOPHILS # BLD AUTO: 0.02 10*3/MM3 (ref 0–0.2)
BASOPHILS NFR BLD AUTO: 0.6 % (ref 0–1.5)
BUN SERPL-MCNC: 12 MG/DL (ref 8–23)
BUN/CREAT SERPL: 11.2 (ref 7–25)
CALCIUM SPEC-SCNC: 8.7 MG/DL (ref 8.6–10.5)
CHLORIDE SERPL-SCNC: 107 MMOL/L (ref 98–107)
CO2 SERPL-SCNC: 22 MMOL/L (ref 22–29)
CREAT SERPL-MCNC: 1.07 MG/DL (ref 0.57–1)
DEPRECATED RDW RBC AUTO: 41 FL (ref 37–54)
EOSINOPHIL # BLD AUTO: 0.34 10*3/MM3 (ref 0–0.4)
EOSINOPHIL NFR BLD AUTO: 9.9 % (ref 0.3–6.2)
ERYTHROCYTE [DISTWIDTH] IN BLOOD BY AUTOMATED COUNT: 12.8 % (ref 12.3–15.4)
GFR SERPL CREATININE-BSD FRML MDRD: 63 ML/MIN/1.73
GLUCOSE BLDC GLUCOMTR-MCNC: 133 MG/DL (ref 70–130)
GLUCOSE BLDC GLUCOMTR-MCNC: 235 MG/DL (ref 70–130)
GLUCOSE SERPL-MCNC: 179 MG/DL (ref 65–99)
HCT VFR BLD AUTO: 35.3 % (ref 34–46.6)
HGB BLD-MCNC: 11.3 G/DL (ref 12–15.9)
IMM GRANULOCYTES # BLD AUTO: 0.01 10*3/MM3 (ref 0–0.05)
IMM GRANULOCYTES NFR BLD AUTO: 0.3 % (ref 0–0.5)
LYMPHOCYTES # BLD AUTO: 1.4 10*3/MM3 (ref 0.7–3.1)
LYMPHOCYTES NFR BLD AUTO: 40.8 % (ref 19.6–45.3)
MCH RBC QN AUTO: 28.2 PG (ref 26.6–33)
MCHC RBC AUTO-ENTMCNC: 32 G/DL (ref 31.5–35.7)
MCV RBC AUTO: 88 FL (ref 79–97)
MONOCYTES # BLD AUTO: 0.4 10*3/MM3 (ref 0.1–0.9)
MONOCYTES NFR BLD AUTO: 11.7 % (ref 5–12)
NEUTROPHILS NFR BLD AUTO: 1.26 10*3/MM3 (ref 1.7–7)
NEUTROPHILS NFR BLD AUTO: 36.7 % (ref 42.7–76)
NRBC BLD AUTO-RTO: 0 /100 WBC (ref 0–0.2)
PLATELET # BLD AUTO: 176 10*3/MM3 (ref 140–450)
PMV BLD AUTO: 11.7 FL (ref 6–12)
POTASSIUM SERPL-SCNC: 3.5 MMOL/L (ref 3.5–5.2)
RBC # BLD AUTO: 4.01 10*6/MM3 (ref 3.77–5.28)
SODIUM SERPL-SCNC: 139 MMOL/L (ref 136–145)
WBC # BLD AUTO: 3.43 10*3/MM3 (ref 3.4–10.8)

## 2021-10-17 PROCEDURE — 82962 GLUCOSE BLOOD TEST: CPT

## 2021-10-17 PROCEDURE — 0 INFLUENZA VAC SPLIT QUAD 0.5 ML SUSPENSION PREFILLED SYRINGE: Performed by: NURSE PRACTITIONER

## 2021-10-17 PROCEDURE — 63710000001 INSULIN LISPRO (HUMAN) PER 5 UNITS: Performed by: NURSE PRACTITIONER

## 2021-10-17 PROCEDURE — 99239 HOSP IP/OBS DSCHRG MGMT >30: CPT | Performed by: NURSE PRACTITIONER

## 2021-10-17 PROCEDURE — 74019 RADEX ABDOMEN 2 VIEWS: CPT

## 2021-10-17 PROCEDURE — G0008 ADMIN INFLUENZA VIRUS VAC: HCPCS | Performed by: NURSE PRACTITIONER

## 2021-10-17 PROCEDURE — 25010000002 PIPERACILLIN SOD-TAZOBACTAM PER 1 G: Performed by: SURGERY

## 2021-10-17 PROCEDURE — 25010000002 INFLUENZA VAC SPLIT QUAD 0.5 ML SUSPENSION PREFILLED SYRINGE: Performed by: NURSE PRACTITIONER

## 2021-10-17 PROCEDURE — 90686 IIV4 VACC NO PRSV 0.5 ML IM: CPT | Performed by: NURSE PRACTITIONER

## 2021-10-17 PROCEDURE — 80048 BASIC METABOLIC PNL TOTAL CA: CPT | Performed by: NURSE PRACTITIONER

## 2021-10-17 PROCEDURE — 85025 COMPLETE CBC W/AUTO DIFF WBC: CPT | Performed by: NURSE PRACTITIONER

## 2021-10-17 PROCEDURE — 25010000002 HEPARIN (PORCINE) PER 1000 UNITS: Performed by: SURGERY

## 2021-10-17 RX ORDER — POLYETHYLENE GLYCOL 3350 17 G/17G
17 POWDER, FOR SOLUTION ORAL DAILY
Status: DISCONTINUED | OUTPATIENT
Start: 2021-10-17 | End: 2021-10-17 | Stop reason: HOSPADM

## 2021-10-17 RX ORDER — ACETAMINOPHEN 325 MG/1
650 TABLET ORAL EVERY 6 HOURS PRN
Start: 2021-10-17

## 2021-10-17 RX ORDER — AMLODIPINE BESYLATE 5 MG/1
5 TABLET ORAL
Status: DISCONTINUED | OUTPATIENT
Start: 2021-10-17 | End: 2021-10-17 | Stop reason: HOSPADM

## 2021-10-17 RX ORDER — POTASSIUM CHLORIDE 750 MG/1
20 CAPSULE, EXTENDED RELEASE ORAL ONCE
Status: COMPLETED | OUTPATIENT
Start: 2021-10-17 | End: 2021-10-17

## 2021-10-17 RX ORDER — POLYETHYLENE GLYCOL 3350 17 G/17G
17 POWDER, FOR SOLUTION ORAL DAILY
Qty: 30 PACKET | Refills: 0 | Status: SHIPPED | OUTPATIENT
Start: 2021-10-18

## 2021-10-17 RX ORDER — AMLODIPINE BESYLATE 5 MG/1
5 TABLET ORAL
Qty: 30 TABLET | Refills: 0 | Status: SHIPPED | OUTPATIENT
Start: 2021-10-18

## 2021-10-17 RX ADMIN — FLUTICASONE PROPIONATE 1 SPRAY: 50 SPRAY, METERED NASAL at 08:24

## 2021-10-17 RX ADMIN — FAMOTIDINE 20 MG: 10 INJECTION, SOLUTION INTRAVENOUS at 08:23

## 2021-10-17 RX ADMIN — CETIRIZINE HYDROCHLORIDE 10 MG: 10 TABLET, FILM COATED ORAL at 08:23

## 2021-10-17 RX ADMIN — LISINOPRIL 20 MG: 20 TABLET ORAL at 08:23

## 2021-10-17 RX ADMIN — HEPARIN SODIUM 5000 UNITS: 5000 INJECTION, SOLUTION INTRAVENOUS; SUBCUTANEOUS at 05:43

## 2021-10-17 RX ADMIN — INFLUENZA VIRUS VACCINE 0.5 ML: 15; 15; 15; 15 SUSPENSION INTRAMUSCULAR at 16:44

## 2021-10-17 RX ADMIN — AMLODIPINE BESYLATE 5 MG: 5 TABLET ORAL at 12:26

## 2021-10-17 RX ADMIN — POTASSIUM CHLORIDE 20 MEQ: 750 CAPSULE, EXTENDED RELEASE ORAL at 12:26

## 2021-10-17 RX ADMIN — SODIUM CHLORIDE 500 ML: 9 INJECTION, SOLUTION INTRAVENOUS at 12:29

## 2021-10-17 RX ADMIN — POLYETHYLENE GLYCOL 3350 17 G: 17 POWDER, FOR SOLUTION ORAL at 12:26

## 2021-10-17 RX ADMIN — INSULIN LISPRO 3 UNITS: 100 INJECTION, SOLUTION INTRAVENOUS; SUBCUTANEOUS at 12:54

## 2021-10-17 RX ADMIN — TAZOBACTAM SODIUM AND PIPERACILLIN SODIUM 3.38 G: 375; 3 INJECTION, SOLUTION INTRAVENOUS at 05:43

## 2021-10-17 NOTE — PROGRESS NOTES
"General Surgery Daily Progress Note    Subjective:  Tolerating p.o. well, denies abdominal pain.  Passing flatus.    Objective:  /80   Pulse (!) 45   Temp 97.7 °F (36.5 °C) (Oral)   Resp 16   Ht 162.6 cm (64\")   Wt 69.4 kg (152 lb 14.4 oz)   SpO2 99%   BMI 26.25 kg/m²       General Appearance: No apparent distress  Eyes: Anicteric  Neck: Trachea midline   Cardiovascular:  RRR without murmur nor rub  Lungs:  Bilateral respirations unlabored   Abdomen:  Soft, non-tender, no masses, no organomegaly   Extremities:  No cyanosis or edema   Skin:  No obvious rashes   Neurologic: awake and conversant       Imaging Results (Last 24 Hours)     Procedure Component Value Units Date/Time    XR Abdomen Flat & Upright [[id number]] Resulted: 10/17/21 0856     Updated: 10/17/21 0908          CBC:  Results from last 7 days   Lab Units 10/17/21  0349   WBC 10*3/mm3 3.43   HEMOGLOBIN g/dL 11.3*   HEMATOCRIT % 35.3   PLATELETS 10*3/mm3 176       CMP:  Results from last 7 days   Lab Units 10/17/21  0349 10/16/21  0442 10/15/21  0934   SODIUM mmol/L 139   < > 139   POTASSIUM mmol/L 3.5   < > 4.0   CHLORIDE mmol/L 107   < > 104   CO2 mmol/L 22.0   < > 23.0   BUN mg/dL 12   < > 16   CREATININE mg/dL 1.07*   < > 0.92   CALCIUM mg/dL 8.7   < > 9.1   BILIRUBIN mg/dL  --   --  0.4   ALK PHOS U/L  --   --  65   ALT (SGPT) U/L  --   --  5   AST (SGOT) U/L  --   --  12   GLUCOSE mg/dL 179*   < > 139*    < > = values in this interval not displayed.         Assessment:  Enteritis with obstructive picture - improving    Plan:   I reviewed her KUB images.  I am going to give her a soapsuds enema.  She needs to continue on a low residue diet.  She is to take MiraLAX daily. Okay from surgical perspective to discharge.  She may follow-up with her gastroenterologist, Ollie Angel MD.    Kuldip Barboza MD - 10/17/2021, 11:19 EDT        "

## 2021-10-17 NOTE — PLAN OF CARE
Goal Outcome Evaluation:  Plan of Care Reviewed With: patient        Progress: improving  Outcome Summary: VSS; NSR -SB on monitor; no complaints this shift; denies pain or nausea ; tolerated her diet and drinking liquids without N/V; abdomen slightly distended; +  flatus, no BM; will continue to monitor

## 2021-10-17 NOTE — DISCHARGE SUMMARY
[facility] Medicine Services  DISCHARGE SUMMARY    Patient Name: Marium Evans  : 1955  MRN: [id number]    Date of Admission: 10/14/2021  Date of Discharge:  10/17/2021  Primary Care Physician: Provider, No Known    Consults     Date and Time Order Name Status Description    10/15/2021  3:26 AM Inpatient General Surgery Consult Completed           Hospital Course     Presenting Problem:   Small bowel obstruction (HCC) [K56.609]    Active Hospital Problems    Diagnosis  POA   • Diabetes mellitus (HCC) [E11.9]  Yes   • Hypertension [I10]  Yes      Resolved Hospital Problems    Diagnosis Date Resolved POA   • **SBO (small bowel obstruction) (HCC) [K56.609] 10/17/2021 Yes   • Lactic acidosis [E87.2] 10/17/2021 Yes   • Elevated serum creatinine [R79.89] 10/17/2021 Yes   • Small bowel obstruction (HCC) [K56.609] 10/17/2021 Yes      Hospital Course:  Marium Evans is a 66 y.o. female past medical history significant for essential hypertension and diabetes mellitus type 2 presented to the ED with complaints of abrupt onset of abdominal pain, nausea and vomiting.  CT abdomen showed closed-loop small bowel obstruction.  General surgery consulted and proceeded with conservative management with improvement of symptoms.  On 10/15, patient was treated empirically with Zosyn for enteritis.  Lactic acidosis was corrected with fluid resuscitation.  Commend patient proceed with a low residue diet.  During hospitalization, A1c found to be 8.5.  Patient was hypertensive during hospitalization.  Amlodipine was added to medication regiment for hypertension.    Discharge Follow Up Recommendations for labs/diagnostics:  -Follow-up with PCP in 1 week  -Follow-up with Dr. Angel in 2 weeks  -Low residue diet, MiraLAX daily    Day of Discharge     HPI:   Patient sitting on side of bed eating breakfast.  Patient alert and orient x3, pleasant and talkative.  Patient denies abdominal pain, nausea.  She reports no  issues overnight.    Review of Systems  Gen- No fevers, chills  CV- No chest pain, palpitations  Resp- No cough, dyspnea  GI- No N/V/D, abd pain  Otherwise ROS is negative except as mentioned in the HPI.    Vital Signs:   Temp:  [97.6 °F (36.4 °C)-98.5 °F (36.9 °C)] 97.7 °F (36.5 °C)  Heart Rate:  [45-59] 45  Resp:  [16] 16  BP: (144-172)/(72-87) 169/80     Physical Exam:  Constitutional: Awake, alert. NAD  Eyes: PERRLA, sclerae anicteric, no conjunctival injection  HENT: NCAT, mucous membranes moist  Neck: Supple, no thyromegaly, no lymphadenopathy, trachea midline  Respiratory: Clear to auscultation bilaterally, nonlabored respirations   Cardiovascular: RRR, no murmurs, rubs, or gallops, palpable pedal pulses bilaterally  Gastrointestinal: Positive bowel sounds, soft, nontender, nondistended  Musculoskeletal: No bilateral ankle edema, no clubbing or cyanosis to extremities  Psychiatric: Appropriate affect, cooperative  Neurologic: Oriented x 3, strength symmetric in all extremities, Cranial Nerves grossly intact to confrontation, speech clear  Skin: No rashes    Assessment/Plan  Closed loop SBO with bowel wall edema and lactic acidosis: resolved  - CT of abd closed-loop small bowel obstruction.  General surgery consulted recommending conservative care which resolved the patient's issues.  Lactic acidosis resolved with fluid resuscitation.  She was treated on 10/15 empirically with Zosyn for enteritis.  Recommend low residue diet, follow-up with Dr. Angel in 2 weeks      Diabetes mellitus type 2-uncontrolled  - A1c 8.5     4) Essential hypertension-improving  - 10/16 BP elevated today. Start back lisinopril 20 mg bid  - 10/17 P still elevated continue lisinopril we will add HCTZ back at discharge and start amlodipine.  The patient to monitor her blood pressure at home.     5) seasonal allergies-stable  - zyrtec, flonase      Pertinent  and/or Most Recent Results     Results from last 7 days   Lab Units  10/17/21  0349 10/16/21  0442 10/15/21  0934 10/14/21  2223   WBC 10*3/mm3 3.43 3.19* 5.84 6.66   HEMOGLOBIN g/dL 11.3* 11.9* 12.3 13.4   HEMATOCRIT % 35.3 37.4 37.1 40.6   PLATELETS 10*3/mm3 176 167 161 224   SODIUM mmol/L 139 138 139 141   POTASSIUM mmol/L 3.5 3.6 4.0 3.7   CHLORIDE mmol/L 107 105 104 101   CO2 mmol/L 22.0 24.0 23.0 24.0   BUN mg/dL 12 13 16 22   CREATININE mg/dL 1.07* 0.99 0.92 1.06*   GLUCOSE mg/dL 179* 101* 139* 179*   CALCIUM mg/dL 8.7 8.8 9.1 10.0     Results from last 7 days   Lab Units 10/15/21  0934 10/14/21  2223   BILIRUBIN mg/dL 0.4 0.3   ALK PHOS U/L 65 81   ALT (SGPT) U/L 5 6   AST (SGOT) U/L 12 15           Invalid input(s): TG, LDLCALC, LDLREALC  Results from last 7 days   Lab Units 10/15/21  0934 10/15/21  0144 10/14/21  2223   HEMOGLOBIN A1C % 8.50*  --   --    LACTATE mmol/L 2.0 1.1 2.6*       Brief Urine Lab Results  (Last result in the past 365 days)      Color   Clarity   Blood   Leuk Est   Nitrite   Protein   CREAT   Urine HCG        10/15/21 0136 Yellow   Clear   Negative   Negative   Negative   Negative                 Microbiology Results Abnormal     Procedure Component Value - Date/Time    COVID PRE-OP / PRE-PROCEDURE SCREENING ORDER (NO ISOLATION) - Swab, Nasopharynx [[id number]]  (Normal) Collected: 10/15/21 0146    Lab Status: Final result Specimen: Swab from Nasopharynx Updated: 10/15/21 0218    Narrative:      The following orders were created for panel order COVID PRE-OP / PRE-PROCEDURE SCREENING ORDER (NO ISOLATION) - Swab, Nasopharynx.  Procedure                               Abnormality         Status                     ---------                               -----------         ------                     COVID-19, ABBOTT IN-HOUS...[[id number]]  Normal              Final result                 Please view results for these tests on the individual orders.    COVID-19, ABBOTT IN-HOUSE,NASAL Swab (NO TRANSPORT MEDIA) 2 HR TAT - Swab, Nasopharynx [[id number]]   (Normal) Collected: 10/15/21 0146    Lab Status: Final result Specimen: Swab from Nasopharynx Updated: 10/15/21 0218     COVID19 Presumptive Negative    Narrative:      Fact sheet for providers: https://www.fda.gov/media/104916/download     Fact sheet for patients: https://www.fda.gov/media/453386/download    Test performed by PCR.  If inconsistent with clinical signs and symptoms patient should be tested with different authorized molecular test.          Imaging Results (All)     Procedure Component Value Units Date/Time    XR Abdomen Flat & Upright [[id number]] Resulted: 10/17/21 0856     Updated: 10/17/21 0908    CT Abdomen Pelvis With Contrast [[id number]] Collected: 10/15/21 0048     Updated: 10/15/21 0050    Narrative:      CT ABDOMEN AND PELVIS WITH CONTRAST, 10/15/2021    HISTORY:  66-year-old female in the ED complaining of. Helical abdominal pain, nausea, vomiting and dark stools today. Surgical history including lysis of abdominal adhesions. No prior imaging here.    TECHNIQUE:  CT imaging of the abdomen and pelvis with IV contrast. Radiation dose reduction techniques included automated exposure control. Radiation audit for CT and nuclear cardiology exams in the last 12 months: 0.    ABDOMEN FINDINGS:  There is a cluster of moderately dilated, fluid-filled small bowel segments within the right mid abdomen and right mid and upper pelvis with some adjacent mesenteric edema. These segments show significant diffuse wall thickening and mucosal  hyperenhancement. Small bowel proximal and distal to this segment is normal in caliber. The findings are concerning for closed loop obstruction with secondary edema and inflammation of unobstructed segments. Primary inflammatory bowel disease such as  Crohn's disease could appear similarly. There is no twisting of mesentery. No body wall hernia or additional etiology for obstruction is identified.    The colon is normal in caliber and appearance. No gastric distention or  distal esophageal dilatation. The appendix is surgically absent by history.    No gallbladder distention or bile duct dilatation. Liver, pancreas, spleen and kidneys are normal in size and appearance, with the exception of a small typical benign left mid renal cyst. Normal caliber abdominal aorta with patent mesenteric arteries.    PELVIS FINDINGS:  Urinary bladder, uterus, adnexal regions and rectum are negative. Small amount of free pelvic fluid. No inguinal hernia.    Mild scarring or atelectasis in the dependent posterior lung bases.      Impression:      1.  Long segment small bowel dilatation within the central abdomen and pelvis to the right of midline with dilated segments showing considerable wall thickening and some adjacent mesenteric edema. The appearance is concerning for closed loop obstruction  with secondary bowel wall edema. Adhesion or internal hernia should be considered. Primary small bowel inflammation due to Crohn's disease could appear similarly.  2.  Remaining segments of small bowel and colon are normal in appearance. No abscess or free intraperitoneal air.    Signer Name: Kuldip Tavarez MD   Signed: 10/15/2021 12:48 AM   Workstation Name: [email or web address]    [facility]                        Discharge Details        Discharge Medications      New Medications      Instructions Start Date   acetaminophen 325 MG tablet  Commonly known as: TYLENOL   650 mg, Oral, Every 6 Hours PRN      amLODIPine 5 MG tablet  Commonly known as: NORVASC   5 mg, Oral, Every 24 Hours Scheduled   Start Date: October 18, 2021     polyethylene glycol 17 g packet  Commonly known as: MIRALAX   17 g, Oral, Daily   Start Date: October 18, 2021        Continue These Medications      Instructions Start Date   fluticasone 50 MCG/ACT nasal spray  Commonly known as: FLONASE   fluticasone propionate 50 mcg/actuation nasal spray,suspension   INSTILL 2 SPRAYS IN EACH NOSTRIL DAILY      glyburide 5 MG  tablet  Commonly known as: DIAbeta   5 mg, Oral, 2 Times Daily      lisinopril-hydrochlorothiazide 20-25 MG per tablet  Commonly known as: PRINZIDE,ZESTORETIC   1 tablet, Oral, 2 Times Daily      metaxalone 800 MG tablet  Commonly known as: SKELAXIN   800 mg, Oral, 3 Times Daily PRN      metFORMIN 500 MG tablet  Commonly known as: GLUCOPHAGE   TAKE 2 TABLETS BY MOUTH EVERY MORNING AND EVERY EVENING      Valtrex 500 MG tablet  Generic drug: valACYclovir   Valtrex 500 mg tablet   1 bid             No Known Allergies      Discharge Disposition:  Home or Self Care    Discharge Diet:  Diet Order   Procedures   • Diet Regular; Thin; GI Soft, Low Fiber / Low Residue         Discharge Activity:   Activity Instructions     Activity as Tolerated              CODE STATUS:    Code Status and Medical Interventions:   Ordered at: 10/15/21 0208     Level Of Support Discussed With:    Patient     Code Status:    CPR     Medical Interventions (Level of Support Prior to Arrest):    Full         Future Appointments   Date Time Provider Department Center   5/18/2022  9:00 AM Regis Atkinson MD [facility]       Additional Instructions for the Follow-ups that You Need to Schedule     Call MD With Problems / Concerns   As directed      Instructions: Take for temperature greater than 100.4, nausea, vomiting,constipation, abdominal pain, chest pain, heart palpitations, shortness of breath    Order Comments: Instructions: Take for temperature greater than 100.4, nausea, vomiting,constipation, abdominal pain, chest pain, heart palpitations, shortness of breath          Discharge Follow-up with PCP   As directed       Currently Documented PCP:    Provider, No Known    PCP Phone Number:    None     Follow Up Details: F/U with PCP in 1 week         Discharge Follow-up with Specified Provider: Dr Agnel; 2 Weeks   As directed      To: Dr Angel    Follow Up: 2 Weeks               Time Spent on Discharge:  45 minutes    Electronically  signed by Buffy Riley, APRN, 10/17/21, 2:33 PM EDT.

## 2021-11-03 ENCOUNTER — TRANSCRIBE ORDERS (OUTPATIENT)
Dept: ADMINISTRATIVE | Facility: HOSPITAL | Age: 66
End: 2021-11-03

## 2021-11-03 DIAGNOSIS — K56.609 INTESTINAL OBSTRUCTION, UNSPECIFIED CAUSE, UNSPECIFIED WHETHER PARTIAL OR COMPLETE (HCC): Primary | ICD-10-CM

## 2021-11-05 ENCOUNTER — HOSPITAL ENCOUNTER (OUTPATIENT)
Dept: CT IMAGING | Facility: HOSPITAL | Age: 66
Discharge: HOME | End: 2021-11-05
Admitting: INTERNAL MEDICINE

## 2021-11-05 DIAGNOSIS — K56.609 INTESTINAL OBSTRUCTION, UNSPECIFIED CAUSE, UNSPECIFIED WHETHER PARTIAL OR COMPLETE (HCC): ICD-10-CM

## 2021-11-05 PROCEDURE — 74177 CT ABD & PELVIS W/CONTRAST: CPT

## 2021-11-05 PROCEDURE — 0 IOPAMIDOL PER 1 ML: Performed by: INTERNAL MEDICINE

## 2021-11-05 RX ADMIN — IOPAMIDOL 125 ML: 755 INJECTION, SOLUTION INTRAVENOUS at 14:42

## 2021-12-01 ENCOUNTER — TRANSCRIBE ORDERS (OUTPATIENT)
Dept: ADMINISTRATIVE | Facility: HOSPITAL | Age: 66
End: 2021-12-01

## 2021-12-01 DIAGNOSIS — Z12.31 VISIT FOR SCREENING MAMMOGRAM: Primary | ICD-10-CM

## 2022-02-09 ENCOUNTER — HOSPITAL ENCOUNTER (OUTPATIENT)
Dept: MAMMOGRAPHY | Facility: HOSPITAL | Age: 67
Discharge: HOME | End: 2022-02-09
Admitting: PHYSICIAN ASSISTANT

## 2022-02-09 DIAGNOSIS — Z12.31 VISIT FOR SCREENING MAMMOGRAM: ICD-10-CM

## 2022-02-09 PROCEDURE — 77063 BREAST TOMOSYNTHESIS BI: CPT

## 2022-02-09 PROCEDURE — 77067 SCR MAMMO BI INCL CAD: CPT

## 2022-02-09 PROCEDURE — 77067 SCR MAMMO BI INCL CAD: CPT | Performed by: RADIOLOGY

## 2022-02-09 PROCEDURE — 77063 BREAST TOMOSYNTHESIS BI: CPT | Performed by: RADIOLOGY

## 2022-06-01 ENCOUNTER — OFFICE VISIT (OUTPATIENT)
Dept: OBSTETRICS AND GYNECOLOGY | Facility: CLINIC | Age: 67
End: 2022-06-01

## 2022-06-01 DIAGNOSIS — N95.2 ATROPHIC VAGINITIS: ICD-10-CM

## 2022-06-01 DIAGNOSIS — Z12.39 ENCOUNTER FOR SCREENING FOR MALIGNANT NEOPLASM OF BREAST, UNSPECIFIED SCREENING MODALITY: ICD-10-CM

## 2022-06-01 DIAGNOSIS — Z12.11 SCREEN FOR COLON CANCER: ICD-10-CM

## 2022-06-01 DIAGNOSIS — Z90.711 HISTORY OF ABDOMINAL SUPRACERVICAL SUBTOTAL HYSTERECTOMY: ICD-10-CM

## 2022-06-01 DIAGNOSIS — Z01.419 ENCOUNTER FOR ANNUAL ROUTINE GYNECOLOGICAL EXAMINATION: ICD-10-CM

## 2022-06-01 DIAGNOSIS — Z78.0 MENOPAUSE: ICD-10-CM

## 2022-06-01 DIAGNOSIS — A60.04 HERPES SIMPLEX VULVOVAGINITIS: ICD-10-CM

## 2022-06-01 DIAGNOSIS — Z12.4 SCREENING FOR CERVICAL CANCER: Primary | ICD-10-CM

## 2022-06-01 DIAGNOSIS — I10 PRIMARY HYPERTENSION: ICD-10-CM

## 2022-06-01 PROCEDURE — 3015F CERV CANCER SCREEN DOCD: CPT | Performed by: OBSTETRICS & GYNECOLOGY

## 2022-06-01 PROCEDURE — G0101 CA SCREEN;PELVIC/BREAST EXAM: HCPCS | Performed by: OBSTETRICS & GYNECOLOGY

## 2022-06-01 RX ORDER — DAPAGLIFLOZIN 10 MG/1
10 TABLET, FILM COATED ORAL
COMMUNITY

## 2022-06-01 NOTE — PROGRESS NOTES
GYN Annual Exam     CC - Here for annual exam.        HPI  Marium Evans is a 66 y.o. female, , who presents for annual well woman exam.  She is postmenopausal.  Patient denies vaginal bleeding. ..  Patient reports problems with: none. There were no changes to her medical or surgical history since her last visit.. Partner Status: Marital Status: .  New Partners since last visit: no.      Additional OB/GYN History   Current contraception: contraceptive methods: None  Desires to: continue contraception  On HRT? No  Last Pap : 21 Neg  Last Completed Pap Smear     This patient has no relevant Health Maintenance data.        History of abnormal Pap smear: no  Family history of uterine, colon, breast, or ovarian cancer: yes - Maternal Aunt-Breast  Performs monthly Self-Breast Exam: no  Last mammogram: 22. Done at .    Last Completed Mammogram          MAMMOGRAM (Every 2 Years) Next due on 2022  Mammo Screening Digital Tomosynthesis Bilateral With CAD    2020  Mammo Screening Digital Tomosynthesis Bilateral With CAD    2019  Mammo Screening Digital Tomosynthesis Bilateral With CAD    2017  Mammo Screening Digital Tomosynthesis Bilateral With CAD    2016  Mammo Screening Digital Tomosynthesis Bilateral With CAD    Only the first 5 history entries have been loaded, but more history exists.              Last colonoscopy: 3/22 Neg  Last Completed Colonoscopy          COLORECTAL CANCER SCREENING (FECAL OCCULT BLOOD TEST - Yearly) Next due on 10/14/2022    10/14/2021  Fecal Occult Blood component of POC Occult Blood Stool              Last DEXA: None-Unknown  Exercises Regularly: yes  Feelings of Anxiety or Depression: no      Tobacco Usage?: No   OB History        2    Para   2    Term   2            AB        Living   2       SAB        IAB        Ectopic        Molar        Multiple        Live Births                    Health Maintenance    Topic Date Due   • URINE MICROALBUMIN  Never done   • Pneumococcal Vaccine 0-64 (1 - PCV) Never done   • ZOSTER VACCINE (1 of 2) Never done   • HEPATITIS C SCREENING  Never done   • ANNUAL WELLNESS VISIT  Never done   • DIABETIC FOOT EXAM  Never done   • COVID-19 Vaccine (4 - Booster) 03/03/2022   • HEMOGLOBIN A1C  04/15/2022   • PAP SMEAR  05/05/2022   • INFLUENZA VACCINE  08/01/2022   • COLORECTAL CANCER SCREENING  10/14/2022   • DIABETIC EYE EXAM  11/16/2022   • Annual Gynecologic Pelvic and Breast Exam  06/02/2023   • MAMMOGRAM  02/09/2024   • TDAP/TD VACCINES (3 - Td or Tdap) 06/19/2028       The additional following portions of the patient's history were reviewed and updated as appropriate: allergies, current medications, past family history, past medical history, past social history, past surgical history and problem list.    Review of Systems   Constitutional: Negative for chills and fever.   Respiratory: Negative.    Cardiovascular: Negative.    Gastrointestinal: Negative for abdominal distention and abdominal pain.   Genitourinary: Negative.    Psychiatric/Behavioral: Negative for dysphoric mood and depressed mood.       I have reviewed and agree with the HPI, ROS, and historical information as entered above. Regis Atkinson MD    Objective   Breastfeeding No     Physical Exam  Vitals and nursing note reviewed. Exam conducted with a chaperone present.   Constitutional:       Appearance: She is well-developed.   HENT:      Head: Normocephalic and atraumatic.   Neck:      Thyroid: No thyroid mass or thyromegaly.   Cardiovascular:      Rate and Rhythm: Normal rate and regular rhythm.      Heart sounds: No murmur heard.  Pulmonary:      Effort: Pulmonary effort is normal. No retractions.      Breath sounds: Normal breath sounds. No wheezing, rhonchi or rales.   Chest:      Chest wall: No mass or tenderness.   Breasts:      Right: Normal. No mass, nipple discharge, skin change or tenderness.      Left:  Normal. No mass, nipple discharge, skin change or tenderness.       Abdominal:      General: Bowel sounds are normal.      Palpations: Abdomen is soft. Abdomen is not rigid. There is no mass.      Tenderness: There is no abdominal tenderness. There is no guarding.      Hernia: No hernia is present. There is no hernia in the left inguinal area.   Genitourinary:     Labia:         Right: No rash, tenderness or lesion.         Left: No rash, tenderness or lesion.       Vagina: Normal. No vaginal discharge or lesions.      Cervix: No cervical motion tenderness, discharge, lesion or cervical bleeding.      Uterus: Absent.       Adnexa:         Right: No mass or tenderness.          Left: No mass or tenderness.        Rectum: No external hemorrhoid.   Musculoskeletal:      Cervical back: Normal range of motion. No muscular tenderness.   Neurological:      Mental Status: She is alert and oriented to person, place, and time.   Psychiatric:         Behavior: Behavior normal.            Assessment and Plan    Problem List Items Addressed This Visit     Screening for breast cancer    Overview     Mamm 11/2020; normal  Mammogram 2/9/2022 was negative.           Screen for colon cancer    Overview     Colonoscopy 2017? 5 year repeat.   Colonoscopy March 2022 with Dr. Pena was negative.  Can repeat in 10 years.           Atrophic vaginitis    Overview     Declines treatment at this time.           Herpes genitalis    Overview     Continues take Valtrex for suppression.           Relevant Medications    valACYclovir (VALTREX) 500 MG tablet    Hypertension    Overview     Stable on lisinopril with hydrochlorothiazide           Relevant Medications    lisinopril-hydrochlorothiazide (PRINZIDE,ZESTORETIC) 20-25 MG per tablet    amLODIPine (NORVASC) 5 MG tablet    Screening for cervical cancer - Primary    Overview     Pap smear done 5/5/2021.           Relevant Orders    LIQUID-BASED PAP SMEAR, [facility] ([facility])    History of  abdominal supracervical subtotal hysterectomy    Overview     2003 supracervical hysterectomy for large fibroids.    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  Extensive lysis of small bowel adhesions to anterior abdominal wall and sigmoid adhesion by Dr. Pena.             Other Visit Diagnoses     Encounter for annual routine gynecological examination        Relevant Orders    LIQUID-BASED PAP SMEAR, [facility] ([facility])          1. GYN annual well woman exam.  66 years of age.  Started Medicare this year.  2. History of supracervical hysterectomy and later BSO.  Pap smear obtained.  3. Mammogram up-to-date.  Needs baseline bone density scan.  4. Reviewed monthly self breast exams.  Instructed to call with lumps, pain, or breast discharge.  Yearly mammograms ordered.  5. Ordered mammogram today.  6. Recommended use of Vitamin D and getting adequate calcium in her diet. (1500mg)  7. Osteoporosis screening ordered today.  8. Reviewed exercise as a preventative health measures.   9. Instructed on [place] exercises and gave patient educational information.  10. RTC in 1 year or PRN with problems.  11. Return in about 1 year (around 6/1/2023) for Bone Density Scan.     Regis Atkinson MD  06/01/2022

## 2022-06-03 LAB — REF LAB TEST METHOD: NORMAL

## 2023-01-11 ENCOUNTER — TRANSCRIBE ORDERS (OUTPATIENT)
Dept: ADMINISTRATIVE | Facility: HOSPITAL | Age: 68
End: 2023-01-11
Payer: MEDICARE

## 2023-01-11 DIAGNOSIS — Z12.31 VISIT FOR SCREENING MAMMOGRAM: Primary | ICD-10-CM

## 2023-03-07 ENCOUNTER — HOSPITAL ENCOUNTER (OUTPATIENT)
Dept: MAMMOGRAPHY | Facility: HOSPITAL | Age: 68
Discharge: HOME | End: 2023-03-07
Admitting: INTERNAL MEDICINE
Payer: MEDICARE

## 2023-03-07 DIAGNOSIS — Z12.31 VISIT FOR SCREENING MAMMOGRAM: ICD-10-CM

## 2023-03-07 PROCEDURE — 77063 BREAST TOMOSYNTHESIS BI: CPT

## 2023-03-07 PROCEDURE — 77067 SCR MAMMO BI INCL CAD: CPT

## 2023-03-07 PROCEDURE — 77063 BREAST TOMOSYNTHESIS BI: CPT | Performed by: RADIOLOGY

## 2023-03-07 PROCEDURE — 77067 SCR MAMMO BI INCL CAD: CPT | Performed by: RADIOLOGY

## 2023-06-14 ENCOUNTER — OFFICE VISIT (OUTPATIENT)
Dept: OBSTETRICS AND GYNECOLOGY | Facility: CLINIC | Age: 68
End: 2023-06-14
Payer: MEDICARE

## 2023-06-14 VITALS
WEIGHT: 133 LBS | BODY MASS INDEX: 22.71 KG/M2 | HEIGHT: 64 IN | SYSTOLIC BLOOD PRESSURE: 110 MMHG | DIASTOLIC BLOOD PRESSURE: 62 MMHG

## 2023-06-14 DIAGNOSIS — Z12.31 ENCOUNTER FOR SCREENING MAMMOGRAM FOR MALIGNANT NEOPLASM OF BREAST: ICD-10-CM

## 2023-06-14 DIAGNOSIS — Z12.4 SCREENING FOR CERVICAL CANCER: ICD-10-CM

## 2023-06-14 DIAGNOSIS — Z90.711 HISTORY OF ABDOMINAL SUPRACERVICAL SUBTOTAL HYSTERECTOMY: ICD-10-CM

## 2023-06-14 DIAGNOSIS — A60.04 HERPES SIMPLEX VULVOVAGINITIS: ICD-10-CM

## 2023-06-14 DIAGNOSIS — N95.2 ATROPHIC VAGINITIS: ICD-10-CM

## 2023-06-14 DIAGNOSIS — Z78.0 POSTMENOPAUSE: ICD-10-CM

## 2023-06-14 DIAGNOSIS — Z01.419 PAP TEST, AS PART OF ROUTINE GYNECOLOGICAL EXAMINATION: Primary | ICD-10-CM

## 2023-06-14 DIAGNOSIS — Z12.11 SCREEN FOR COLON CANCER: ICD-10-CM

## 2023-06-14 DIAGNOSIS — Z78.0 MENOPAUSE: Primary | ICD-10-CM

## 2023-06-14 DIAGNOSIS — N95.1 HOT FLASHES DUE TO MENOPAUSE: ICD-10-CM

## 2023-06-14 RX ORDER — ROSUVASTATIN CALCIUM 5 MG/1
5 TABLET, COATED ORAL DAILY
COMMUNITY

## 2023-06-14 NOTE — PROGRESS NOTES
Gynecologic Annual Exam Note        GYN Annual Exam     CC - Here for annual exam.     Subjective     HPI  Marium Evans is a 68 y.o. female, , who presents for annual well woman exam as a(n) established patient.  She is s/p hysterectomy.  Patient denies vaginal bleeding. ..  Patient reports problems with: hot flashes, insomnia, and night sweats. Pt. reports no urinary incontinence. There were no changes to her medical or surgical history since her last visit. She was hospitalized 10/22 for possible bowel obstruction but no definitive diagnosis was made. She also reports pelvic pain that has been present for years. She reports it is intermittent and can range from dull to sharp. Partner Status: Marital Status: .  She is not currently sexually active. STD testing recommendations have been explained to the patient and she does not desire STD testing.    Additional OB/GYN History     On HRT? No    Last Pap : 22. Results: negative. HPV: not done.     Last Completed Pap Smear       This patient has no relevant Health Maintenance data.          History of abnormal Pap smear: no  Family history of uterine, colon, breast, or ovarian cancer: yes - Breast: [facility]  Performs monthly Self-Breast Exam: no  Last mammogram: 3/07/23. Done at .    Last Completed Mammogram            Ordered - MAMMOGRAM (Every 2 Years) Ordered on 2023  Mammo Screening Digital Tomosynthesis Bilateral With CAD    2022  Mammo Screening Digital Tomosynthesis Bilateral With CAD    2020  Mammo Screening Digital Tomosynthesis Bilateral With CAD    2019  Mammo Screening Digital Tomosynthesis Bilateral With CAD    2017  Mammo Screening Digital Tomosynthesis Bilateral With CAD    Only the first 5 history entries have been loaded, but more history exists.                  Last colonoscopy: has had a colonoscopy 1 year(s) ago.    Last Completed Colonoscopy       This patient has no relevant  Health Maintenance data.          Last DEXA: On  and results were Normal  Exercises Regularly: yes  Feelings of Anxiety or Depression: no      Tobacco Usage?: No       Current Outpatient Medications:     acetaminophen (TYLENOL) 325 MG tablet, Take 2 tablets by mouth Every 6 (Six) Hours As Needed for Mild Pain ., Disp: , Rfl:     amLODIPine (NORVASC) 5 MG tablet, Take 1 tablet by mouth Daily., Disp: 30 tablet, Rfl: 0    dapagliflozin Propanediol (Farxiga) 10 MG tablet, Take 10 mg by mouth., Disp: , Rfl:     fluticasone (FLONASE) 50 MCG/ACT nasal spray, fluticasone propionate 50 mcg/actuation nasal spray,suspension  INSTILL 2 SPRAYS IN EACH NOSTRIL DAILY, Disp: , Rfl:     glyburide (DIAbeta) 5 MG tablet, Take 5 mg by mouth 2 (Two) Times a Day., Disp: , Rfl:     lisinopril-hydrochlorothiazide (PRINZIDE,ZESTORETIC) 20-25 MG per tablet, Take 1 tablet by mouth 2 (Two) Times a Day., Disp: , Rfl:     metaxalone (SKELAXIN) 800 MG tablet, Take 800 mg by mouth 3 (Three) Times a Day As Needed for Muscle Spasms., Disp: , Rfl:     metFORMIN (GLUCOPHAGE) 500 MG tablet, TAKE 2 TABLETS BY MOUTH EVERY MORNING AND EVERY EVENING, Disp: , Rfl:     polyethylene glycol (MIRALAX) 17 g packet, Take 17 g by mouth Daily., Disp: 30 packet, Rfl: 0    rosuvastatin (CRESTOR) 5 MG tablet, Take 1 tablet by mouth Daily., Disp: , Rfl:     valACYclovir (VALTREX) 500 MG tablet, Valtrex 500 mg tablet  1 bid, Disp: , Rfl:     Patient denies the need for medication refills today.    OB History          2    Para   2    Term   2            AB        Living   2         SAB        IAB        Ectopic        Molar        Multiple        Live Births                    Past Medical History:   Diagnosis Date    Arthritis     Atrophic vaginitis     Diabetes mellitus     Herpes genitalis     Hypertension     Ovarian cyst     Peritoneal adhesions     Small bowel obstruction     Vaginal fibroids     uterine        Past Surgical History:   Procedure  Laterality Date    APPENDECTOMY      BUNIONECTOMY       SECTION      HYSTERECTOMY      LYSIS OF ABDOMINAL ADHESIONS      MOUTH SURGERY      gum surgery    OOPHORECTOMY      TRIGGER FINGER RELEASE      TUBAL ABDOMINAL LIGATION         Health Maintenance   Topic Date Due    URINE MICROALBUMIN  Never done    DXA SCAN  Never done    Pneumococcal Vaccine 65+ (1 - PCV) Never done    ZOSTER VACCINE (1 of 2) Never done    HEPATITIS C SCREENING  Never done    ANNUAL WELLNESS VISIT  Never done    DIABETIC FOOT EXAM  Never done    DIABETIC EYE EXAM  Never done    HEMOGLOBIN A1C  04/15/2022    COLORECTAL CANCER SCREENING  10/14/2022    COVID-19 Vaccine (7 - Moderna series) 2023    PAP SMEAR  2023    INFLUENZA VACCINE  10/01/2023    MAMMOGRAM  2025    TDAP/TD VACCINES (3 - Td or Tdap) 2028       The additional following portions of the patient's history were reviewed and updated as appropriate: allergies, current medications, past family history, past medical history, past social history, past surgical history, and problem list.    Review of Systems   Constitutional: Negative.    HENT: Negative.     Eyes: Negative.    Respiratory: Negative.     Cardiovascular: Negative.    Gastrointestinal: Negative.  Positive for abdominal pain and constipation. Negative for abdominal distention and blood in stool.   Endocrine: Negative.    Genitourinary:  Negative for breast discharge, breast lump, breast pain, dysuria, flank pain, frequency, pelvic pain, pelvic pressure, vaginal bleeding, vaginal discharge and vaginal pain.        Hot flashes, night sweats, insomnia   Musculoskeletal: Negative.    Skin: Negative.    Allergic/Immunologic: Negative.    Neurological: Negative.    Hematological: Negative.    Psychiatric/Behavioral: Negative.     All other systems reviewed and are negative.      I have reviewed and agree with the HPI, ROS, and historical information as entered above.  No changes noted      "    Objective   /62   Ht 162.6 cm (64\")   Wt 60.3 kg (133 lb)   BMI 22.83 kg/m²     Physical Exam  Vitals and nursing note reviewed. Exam conducted with a chaperone present.   Constitutional:       Appearance: She is well-developed.   HENT:      Head: Normocephalic and atraumatic.   Neck:      Thyroid: No thyroid mass or thyromegaly.   Cardiovascular:      Rate and Rhythm: Normal rate and regular rhythm.      Heart sounds: No murmur heard.  Pulmonary:      Effort: Pulmonary effort is normal. No retractions.      Breath sounds: Normal breath sounds. No wheezing, rhonchi or rales.   Chest:      Chest wall: No mass or tenderness.   Breasts:     Right: Normal. No mass, nipple discharge, skin change or tenderness.      Left: Normal. No mass, nipple discharge, skin change or tenderness.   Abdominal:      General: Bowel sounds are normal.      Palpations: Abdomen is soft. Abdomen is not rigid. There is no mass.      Tenderness: There is no abdominal tenderness. There is no guarding.      Hernia: No hernia is present. There is no hernia in the left inguinal area.   Genitourinary:     General: Normal vulva.      Exam position: Lithotomy position.      Labia:         Right: No rash, tenderness or lesion.         Left: No rash, tenderness or lesion.       Vagina: Normal. No vaginal discharge or lesions.      Cervix: No cervical motion tenderness, discharge, lesion or cervical bleeding.      Uterus: Absent.       Adnexa: Right adnexa normal and left adnexa normal.      Rectum: No external hemorrhoid.      Comments: Cervix without lesions.  Atrophy of vaginal mucosa noted without discharge.  Uterus and ovaries absent.  No adnexal masses or tenderness.  Musculoskeletal:      Cervical back: Normal range of motion. No muscular tenderness.   Neurological:      Mental Status: She is alert and oriented to person, place, and time.   Psychiatric:         Behavior: Behavior normal.          Assessment and Plan    Problem List " Items Addressed This Visit       Screening for breast cancer    Overview     Mamm 11/2020; normal  Mammogram 2/9/2022 was negative.  Mamm 3/7/2023 was negative.          Relevant Orders    Mammo Screening Digital Tomosynthesis Bilateral With CAD    Screen for colon cancer    Overview     Colonoscopy 2017? 5 year repeat.   Colonoscopy March 2022 with Dr. Pena was negative.  Can repeat in 10 years.         Atrophic vaginitis    Overview     Declines treatment at this time.         Herpes genitalis    Overview     Continues take Valtrex for suppression.         Relevant Medications    valACYclovir (VALTREX) 500 MG tablet    Screening for cervical cancer    Overview     Pap smear done 5/5/2021.  Pap smear 6/1/2022 was negative.         History of abdominal supracervical subtotal hysterectomy    Overview     2003 supracervical hysterectomy for large fibroids.    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  Extensive lysis of small bowel adhesions to anterior abdominal wall and sigmoid adhesion by Dr. Pena.          Other Visit Diagnoses       Pap test, as part of routine gynecological examination    -  Primary    Relevant Orders    LIQUID-BASED PAP SMEAR, [facility] ([facility])    Hot flashes due to menopause        Relevant Orders    LIQUID-BASED PAP SMEAR, [facility] ([facility])            GYN annual well woman exam.  68 years of age.  Status post prior supracervical hysterectomy with subsequent BSO.    Complains of left lower quadrant abdominal pain which is intermittent.  Could be due to constipation.  Does have history of intra-abdominal adhesions.  Try Metamucil daily and see if improves.  Prior admission for small bowel obstruction in October 2021.  Has followed up with [facility] and reportedly had negative colonoscopy.  Pap smear done today.  History of genital herpes.  Takes Valtrex as needed.  Reviewed monthly self breast exams.  Instructed to call with lumps, pain, or breast  discharge.  Yearly mammograms ordered.  Ordered mammogram today.  Recommended use of Vitamin D and getting adequate calcium in her diet. (1500mg)  Recommended Flu Vaccine in Fall of each year.  RTC in 1 year or PRN with problems.  No follow-ups on file.    Regis Atkinson MD  06/14/2023

## 2023-06-15 LAB — REF LAB TEST METHOD: NORMAL

## 2024-03-12 ENCOUNTER — HOSPITAL ENCOUNTER (OUTPATIENT)
Dept: MAMMOGRAPHY | Facility: HOSPITAL | Age: 69
Discharge: HOME | End: 2024-03-12
Admitting: OBSTETRICS & GYNECOLOGY
Payer: COMMERCIAL

## 2024-03-12 DIAGNOSIS — Z12.31 ENCOUNTER FOR SCREENING MAMMOGRAM FOR MALIGNANT NEOPLASM OF BREAST: ICD-10-CM

## 2024-03-12 PROCEDURE — 77067 SCR MAMMO BI INCL CAD: CPT

## 2024-03-12 PROCEDURE — 77063 BREAST TOMOSYNTHESIS BI: CPT

## 2024-06-26 ENCOUNTER — OFFICE VISIT (OUTPATIENT)
Dept: OBSTETRICS AND GYNECOLOGY | Facility: CLINIC | Age: 69
End: 2024-06-26
Payer: MEDICARE

## 2024-06-26 VITALS
SYSTOLIC BLOOD PRESSURE: 108 MMHG | WEIGHT: 135 LBS | HEIGHT: 64 IN | BODY MASS INDEX: 23.05 KG/M2 | DIASTOLIC BLOOD PRESSURE: 70 MMHG

## 2024-06-26 DIAGNOSIS — Z90.711 HISTORY OF ABDOMINAL SUPRACERVICAL SUBTOTAL HYSTERECTOMY: ICD-10-CM

## 2024-06-26 DIAGNOSIS — L72.3 SEBACEOUS CYST OF AXILLA: ICD-10-CM

## 2024-06-26 DIAGNOSIS — A60.04 HERPES SIMPLEX VULVOVAGINITIS: ICD-10-CM

## 2024-06-26 DIAGNOSIS — Z12.4 SCREENING FOR CERVICAL CANCER: ICD-10-CM

## 2024-06-26 DIAGNOSIS — Z12.11 SCREEN FOR COLON CANCER: ICD-10-CM

## 2024-06-26 DIAGNOSIS — Z12.31 ENCOUNTER FOR SCREENING MAMMOGRAM FOR MALIGNANT NEOPLASM OF BREAST: Primary | ICD-10-CM

## 2024-06-26 DIAGNOSIS — N95.2 ATROPHIC VAGINITIS: ICD-10-CM

## 2024-06-26 PROCEDURE — 3074F SYST BP LT 130 MM HG: CPT | Performed by: OBSTETRICS & GYNECOLOGY

## 2024-06-26 PROCEDURE — 3078F DIAST BP <80 MM HG: CPT | Performed by: OBSTETRICS & GYNECOLOGY

## 2024-06-26 PROCEDURE — 99214 OFFICE O/P EST MOD 30 MIN: CPT | Performed by: OBSTETRICS & GYNECOLOGY

## 2024-06-26 RX ORDER — VALACYCLOVIR HYDROCHLORIDE 500 MG/1
500 TABLET, FILM COATED ORAL DAILY
Qty: 30 TABLET | Refills: 11 | Status: SHIPPED | OUTPATIENT
Start: 2024-06-26

## 2024-06-26 RX ORDER — TERBUTALINE SULFATE 5 MG/1
5 TABLET ORAL EVERY 6 HOURS
COMMUNITY

## 2024-06-26 NOTE — PROGRESS NOTES
Postmenopausal visit    Chief Complaint   Patient presents with    Gynecologic Exam        Subjective     HPI  Marium Evans is a 69 y.o. female, , who presents as a(n) established patient. She is s/p Supracervical abdominal hysterectomy, bilateral salpingo-oophorectomy.There were no changes to her medical or surgical history since her last visit.. Marital Status: .  She is not currently sexually active. STD testing recommendations have been explained to the patient and she {declines STD testing.    Patient reports problems with:  She reports that she is experiencing HSV breakouts more frequently . Reports that she has a bump under her right arm.   Pt. reports no urinary incontinence.     Additional OB/GYN History     On HRT? No    Last Pap : 23. Results: negative.    Last Completed Pap Smear       This patient has no relevant Health Maintenance data.          History of abnormal Pap smear: no  Family history of uterine, colon, breast, or ovarian cancer: yes - maternal aunt-breast  Performs monthly Self-Breast Exam: no  Last mammogram: 3-12-24. Done at .    Last Completed Mammogram            Ordered - MAMMOGRAM (Every 2 Years) Ordered on 2024  Mammo Screening Digital Tomosynthesis Bilateral With CAD    2023  Mammo Screening Digital Tomosynthesis Bilateral With CAD    2022  Mammo Screening Digital Tomosynthesis Bilateral With CAD    2020  Mammo Screening Digital Tomosynthesis Bilateral With CAD    2019  Mammo Screening Digital Tomosynthesis Bilateral With CAD    Only the first 5 history entries have been loaded, but more history exists.                  Last colonoscopy: has had a colonoscopy 3 years ago    Last Completed Colonoscopy       This patient has no relevant Health Maintenance data.          Her last bone density scan was 1 year ago and results were Normal per pt  Exercises Regularly: yes  Feelings of Anxiety or Depression:  no    Tobacco Usage?: No       Current Outpatient Medications:     acetaminophen (TYLENOL) 325 MG tablet, Take 2 tablets by mouth Every 6 (Six) Hours As Needed for Mild Pain ., Disp: , Rfl:     amLODIPine (NORVASC) 5 MG tablet, Take 1 tablet by mouth Daily., Disp: 30 tablet, Rfl: 0    glyburide (DIAbeta) 5 MG tablet, Take 1 tablet by mouth 2 (Two) Times a Day., Disp: , Rfl:     lisinopril-hydrochlorothiazide (PRINZIDE,ZESTORETIC) 20-25 MG per tablet, Take 1 tablet by mouth 2 (Two) Times a Day., Disp: , Rfl:     metaxalone (SKELAXIN) 800 MG tablet, Take 1 tablet by mouth 3 (Three) Times a Day As Needed for Muscle Spasms., Disp: , Rfl:     metFORMIN (GLUCOPHAGE) 500 MG tablet, TAKE 2 TABLETS BY MOUTH EVERY MORNING AND EVERY EVENING, Disp: , Rfl:     polyethylene glycol (MIRALAX) 17 g packet, Take 17 g by mouth Daily., Disp: 30 packet, Rfl: 0    rosuvastatin (CRESTOR) 5 MG tablet, Take 1 tablet by mouth Daily., Disp: , Rfl:     terbutaline (BRETHINE) 5 MG tablet, Take 1 tablet by mouth Every 6 (Six) Hours., Disp: , Rfl:     VENLAFAXINE HCL PO, Take  by mouth., Disp: , Rfl:     dapagliflozin Propanediol (Farxiga) 10 MG tablet, Take 10 mg by mouth. (Patient not taking: Reported on 2024), Disp: , Rfl:     fluticasone (FLONASE) 50 MCG/ACT nasal spray, fluticasone propionate 50 mcg/actuation nasal spray,suspension  INSTILL 2 SPRAYS IN EACH NOSTRIL DAILY (Patient not taking: Reported on 2024), Disp: , Rfl:     valACYclovir (VALTREX) 500 MG tablet, Take 1 tablet by mouth Daily. For suppression.  Increase to twice daily for 5 days as needed recurrence., Disp: 30 tablet, Rfl: 11    Patient denies the need for medication refills today.    OB History          2    Para   2    Term   2            AB        Living   2         SAB        IAB        Ectopic        Molar        Multiple        Live Births   2                Past Medical History:   Diagnosis Date    Arthritis     Atrophic vaginitis     Diabetes  mellitus     Herpes genitalis     Hypertension     Ovarian cyst     Peritoneal adhesions     Small bowel obstruction     Vaginal fibroids     uterine        Past Surgical History:   Procedure Laterality Date    APPENDECTOMY      BUNIONECTOMY       SECTION      HYSTERECTOMY      LYSIS OF ABDOMINAL ADHESIONS      MOUTH SURGERY      gum surgery    OOPHORECTOMY      TRIGGER FINGER RELEASE      TUBAL ABDOMINAL LIGATION         Health Maintenance   Topic Date Due    URINE MICROALBUMIN  Never done    DXA SCAN  Never done    Pneumococcal Vaccine 65+ (1 of 2 - PCV) Never done    ZOSTER VACCINE (1 of 2) Never done    RSV Vaccine - Adults (1 - 1-dose 60+ series) Never done    HEPATITIS C SCREENING  Never done    ANNUAL WELLNESS VISIT  Never done    HEMOGLOBIN A1C  04/15/2022    COLORECTAL CANCER SCREENING  10/14/2022    DIABETIC FOOT EXAM  2024    COVID-19 Vaccine (2023-24 season) 2024    PAP SMEAR  2024    INFLUENZA VACCINE  2024    DIABETIC EYE EXAM  2025    MAMMOGRAM  2026    TDAP/TD VACCINES (3 - Td or Tdap) 2028       The additional following portions of the patient's history were reviewed and updated as appropriate: allergies, current medications, past family history, past medical history, past social history, past surgical history, and problem list.    Review of Systems   Constitutional: Negative.    HENT: Negative.     Eyes: Negative.    Respiratory: Negative.     Cardiovascular: Negative.    Gastrointestinal: Negative.    Endocrine: Negative.    Genitourinary: Negative.    Musculoskeletal: Negative.    Skin: Negative.    Allergic/Immunologic: Negative.    Neurological: Negative.    Hematological: Negative.    Psychiatric/Behavioral: Negative.         I have reviewed and agree with the HPI, ROS, and historical information as entered above.   Regis Atkinson MD           Objective   /70 (BP Location: Right arm, Patient Position: Sitting, Cuff Size: Adult)   " Ht 162.6 cm (64\")   Wt 61.2 kg (135 lb)   Breastfeeding No   BMI 23.17 kg/m²     Physical Exam  Vitals and nursing note reviewed. Exam conducted with a chaperone present.   Constitutional:       Appearance: Normal appearance. She is well-developed.   HENT:      Head: Normocephalic and atraumatic.   Neck:      Thyroid: No thyroid mass or thyromegaly.   Cardiovascular:      Rate and Rhythm: Normal rate and regular rhythm.      Heart sounds: Normal heart sounds. No murmur heard.  Pulmonary:      Effort: Pulmonary effort is normal. No retractions.      Breath sounds: Normal breath sounds. No wheezing, rhonchi or rales.   Chest:      Chest wall: No mass or tenderness.   Breasts:     Right: Normal. No mass, nipple discharge, skin change or tenderness.      Left: Normal. No mass, nipple discharge, skin change or tenderness.   Abdominal:      General: Bowel sounds are normal.      Palpations: Abdomen is soft. Abdomen is not rigid. There is no mass.      Tenderness: There is no abdominal tenderness. There is no guarding.      Hernia: No hernia is present. There is no hernia in the left inguinal area.   Genitourinary:     General: Normal vulva.      Exam position: Lithotomy position.      Labia:         Right: No rash, tenderness or lesion.         Left: No rash, tenderness or lesion.       Vagina: Normal. No vaginal discharge or lesions.      Cervix: No cervical motion tenderness, discharge, lesion or cervical bleeding.      Uterus: Absent.       Adnexa:         Right: No mass or tenderness.          Left: No mass or tenderness.        Rectum: No external hemorrhoid.      Comments: Atrophy and flattening of vaginal rugated without discharge.  Cervix without lesions.  Patient is status post hysterectomy.  No adnexal masses or tenderness.  Musculoskeletal:      Cervical back: Normal range of motion and neck supple. No muscular tenderness.   Lymphadenopathy:      Cervical: No cervical adenopathy.   Neurological:      " Mental Status: She is alert and oriented to person, place, and time.   Psychiatric:         Behavior: Behavior normal.         Assessment and Plan         Problem List Items Addressed This Visit          Gynecologic and Obstetric Problems    Atrophic vaginitis    Overview     Declines treatment at this time.         Herpes genitalis    Overview     Continues take Valtrex for suppression.  6/26/2024 request Rx for suppressive therapy due to more frequent outbreaks.         Relevant Medications    valACYclovir (VALTREX) 500 MG tablet       Other    History of abdominal supracervical subtotal hysterectomy    Overview     2003 supracervical hysterectomy for large fibroids.    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  Extensive lysis of small bowel adhesions to anterior abdominal wall and sigmoid adhesion by Dr. Pena.         Screen for colon cancer    Overview     Colonoscopy 2017? 5 year repeat.   Colonoscopy March 2022 with Dr. Pena was negative.  Can repeat in 10 years.         Screening for breast cancer - Primary    Overview     Mamm 11/2020; normal  Mammogram 2/9/2022 was negative.  Mamm 3/7/2023 was negative.   Mammogram 3/12/2024 was negative.         Relevant Orders    Mammo Screening Digital Tomosynthesis Bilateral With CAD    Screening for cervical cancer    Overview     Pap smear done 5/5/2021.  Pap smear 6/1/2022 was negative.  Pap smear 6/14/2023 was negative.         Sebaceous cyst of axilla    Overview     6/26/2024 sebaceous cyst in right axilla.  Follow-up with dermatology.         Relevant Orders    Ambulatory Referral to Dermatology (Completed)       Reviewed monthly self breast exams.  Instructed to call with lumps, pain, or breast discharge.  Yearly mammograms ordered.  Ordered mammogram today.  Recommended use of Vitamin D and getting adequate calcium in her diet. (1500mg)  Reviewed exercise as a preventative health measures.   Recommended Flu Vaccine in Fall  of each year.  RTC in 1 year or PRN with problems.  Cervical cancer screening.  Pap smear 6/13/2023 was negative.  Can repeat next year if desires.  Discussed cessation of cervical cancer screening with Pap smears.  Genital herpes.  Complains of more frequent outbreaks.  Wishes to start suppressive therapy.  Rx Valtrex 500 mg daily sent to pharmacy.  Can increase to 2 twice daily as needed for outbreaks.  Atrophic vaginitis.  Declines Rx.  Sebaceous cyst in right axilla.  Improvement after hot compresses.  Can follow-up with dermatology for possible excision.  Return in about 1 year (around 6/26/2025) for Annual physical.    Regis Atkinson MD  06/26/2024

## 2025-03-16 LAB
NCCN CRITERIA FLAG: NORMAL
TYRER CUZICK SCORE: 8.8

## 2025-03-21 ENCOUNTER — HOSPITAL ENCOUNTER (OUTPATIENT)
Dept: MAMMOGRAPHY | Facility: HOSPITAL | Age: 70
Discharge: HOME | End: 2025-03-21
Admitting: OBSTETRICS & GYNECOLOGY
Payer: MEDICARE

## 2025-03-21 DIAGNOSIS — Z12.31 ENCOUNTER FOR SCREENING MAMMOGRAM FOR MALIGNANT NEOPLASM OF BREAST: ICD-10-CM

## 2025-03-21 PROCEDURE — 77063 BREAST TOMOSYNTHESIS BI: CPT

## 2025-03-21 PROCEDURE — 77067 SCR MAMMO BI INCL CAD: CPT

## 2025-07-02 ENCOUNTER — OFFICE VISIT (OUTPATIENT)
Dept: OBSTETRICS AND GYNECOLOGY | Facility: CLINIC | Age: 70
End: 2025-07-02
Payer: MEDICARE

## 2025-07-02 VITALS
BODY MASS INDEX: 23.29 KG/M2 | DIASTOLIC BLOOD PRESSURE: 64 MMHG | HEIGHT: 64 IN | WEIGHT: 136.4 LBS | SYSTOLIC BLOOD PRESSURE: 112 MMHG

## 2025-07-02 DIAGNOSIS — Z01.419 WOMEN'S ANNUAL ROUTINE GYNECOLOGICAL EXAMINATION: Primary | ICD-10-CM

## 2025-07-02 DIAGNOSIS — Z12.4 SCREENING FOR CERVICAL CANCER: ICD-10-CM

## 2025-07-02 DIAGNOSIS — N95.2 ATROPHIC VAGINITIS: ICD-10-CM

## 2025-07-02 DIAGNOSIS — Z90.711 HISTORY OF ABDOMINAL SUPRACERVICAL SUBTOTAL HYSTERECTOMY: ICD-10-CM

## 2025-07-02 DIAGNOSIS — N39.3 SUI (STRESS URINARY INCONTINENCE, FEMALE): ICD-10-CM

## 2025-07-02 DIAGNOSIS — Z12.31 ENCOUNTER FOR SCREENING MAMMOGRAM FOR MALIGNANT NEOPLASM OF BREAST: ICD-10-CM

## 2025-07-02 DIAGNOSIS — A60.00 GENITAL HERPES SIMPLEX, UNSPECIFIED SITE: ICD-10-CM

## 2025-07-02 RX ORDER — VALACYCLOVIR HYDROCHLORIDE 500 MG/1
500 TABLET, FILM COATED ORAL DAILY
Qty: 30 TABLET | Refills: 11 | Status: SHIPPED | OUTPATIENT
Start: 2025-07-02

## 2025-07-02 RX ORDER — HYDROXYCHLOROQUINE SULFATE 200 MG/1
200 TABLET, FILM COATED ORAL DAILY
COMMUNITY
Start: 2025-03-06

## 2025-07-02 RX ORDER — AZELASTINE 1 MG/ML
2 SPRAY, METERED NASAL DAILY
COMMUNITY
Start: 2025-05-21

## 2025-07-02 NOTE — PROGRESS NOTES
Gynecologic Annual Exam Note        GYN Annual Exam     Chief Complaint   Patient presents with    Gynecologic Exam        Subjective     HPI  Marium Evans is a 70 y.o. female, , who presents for annual well woman exam as a(n) established patient.  She is s/p Supracervical abdominal hysterectomy, bilateral salpingo-oophorectomy in  for fibroids per [facility]. There were no changes to her medical or surgical history since her last visit.. Marital Status: .  She is not currently sexually active STD testing recommendations have been explained to the patient and she declines STD testing.    The patient would like to discuss the following complaints today: None      Additional OB/GYN History     On HRT? No    Last Pap : 2023. Results: negative. HPV: not completed.     Last Completed Pap Smear    This patient has no relevant Health Maintenance data.       History of abnormal Pap smear: no  Family history of uterine, colon, breast, or ovarian cancer: yes - Maternal Aunt-Breast   Performs monthly Self-Breast Exam: no  Last mammogram: 2025. Done at . There is a copy in the chart.    Last Completed Mammogram            Awaiting Completion       MAMMOGRAM (Every 2 Years) Order placed this encounter      2025  Order placed for Mammo Screening Digital Tomosynthesis Bilateral With CAD by Radha Astorga APRN    2025  Mammo Screening Digital Tomosynthesis Bilateral With CAD    2024  Mammo Screening Digital Tomosynthesis Bilateral With CAD    2023  Mammo Screening Digital Tomosynthesis Bilateral With CAD    2022  Mammo Screening Digital Tomosynthesis Bilateral With CAD     Only the first 5 history entries have been loaded, but more history exists.                        Last colonoscopy: 2025 with Dr. Pena- Negative per pt     Last Completed Colonoscopy            Upcoming       COLORECTAL CANCER SCREENING (COLONOSCOPY - Every 10 Years) Next due on  4/29/2035 04/29/2025  COLONOSCOPY (Patient-Reported (Performed Externally) -  Pap- Negative)    10/14/2021  Fecal Occult Blood component of POC Occult Blood Stool                            Her last bone density scan was 3 years ago and results were [facility] per patient   Exercises Regularly: yes-intermittently  Feelings of Anxiety or Depression: no      Tobacco Usage?: No       Current Outpatient Medications:     acetaminophen (TYLENOL) 325 MG tablet, Take 2 tablets by mouth Every 6 (Six) Hours As Needed for Mild Pain ., Disp: , Rfl:     amLODIPine (NORVASC) 5 MG tablet, Take 1 tablet by mouth Daily., Disp: 30 tablet, Rfl: 0    azelastine (ASTELIN) 0.1 % nasal spray, 2 sprays Daily., Disp: , Rfl:     dapagliflozin Propanediol (Farxiga) 10 MG tablet, Take 10 mg by mouth., Disp: , Rfl:     fluticasone (FLONASE) 50 MCG/ACT nasal spray, , Disp: , Rfl:     hydroxychloroquine (PLAQUENIL) 200 MG tablet, Take 1 tablet by mouth Daily., Disp: , Rfl:     lisinopril-hydrochlorothiazide (PRINZIDE,ZESTORETIC) 20-25 MG per tablet, Take 1 tablet by mouth 2 (Two) Times a Day., Disp: , Rfl:     metaxalone (SKELAXIN) 800 MG tablet, Take 1 tablet by mouth 3 (Three) Times a Day As Needed for Muscle Spasms., Disp: , Rfl:     metFORMIN (GLUCOPHAGE) 500 MG tablet, TAKE 2 TABLETS BY MOUTH EVERY MORNING AND EVERY EVENING, Disp: , Rfl:     polyethylene glycol (MIRALAX) 17 g packet, Take 17 g by mouth Daily., Disp: 30 packet, Rfl: 0    rosuvastatin (CRESTOR) 5 MG tablet, Take 1 tablet by mouth Daily., Disp: , Rfl:     terbutaline (BRETHINE) 5 MG tablet, Take 1 tablet by mouth Every 6 (Six) Hours., Disp: , Rfl:     valACYclovir (VALTREX) 500 MG tablet, Take 1 tablet by mouth Daily. For suppression.  Increase to twice daily for 5 days as needed recurrence., Disp: 30 tablet, Rfl: 11    VENLAFAXINE HCL PO, Take  by mouth., Disp: , Rfl:     glyburide (DIAbeta) 5 MG tablet, Take 1 tablet by mouth 2 (Two) Times a Day., Disp: ,  Rfl:     Patient requesting refills for Valtrex.    OB History          2    Para   2    Term   2            AB        Living   2         SAB        IAB        Ectopic        Molar        Multiple        Live Births   2                Past Medical History:   Diagnosis Date    Arthritis     Atrophic vaginitis     Diabetes mellitus     Herpes genitalis     Hypertension     Ovarian cyst     Peritoneal adhesions     Small bowel obstruction     Vaginal fibroids     uterine        Past Surgical History:   Procedure Laterality Date    APPENDECTOMY      BUNIONECTOMY       SECTION      HYSTERECTOMY      LYSIS OF ABDOMINAL ADHESIONS      MOUTH SURGERY      gum surgery    OOPHORECTOMY      TRIGGER FINGER RELEASE      TUBAL ABDOMINAL LIGATION         Health Maintenance   Topic Date Due    DXA SCAN  Never done    URINE MICROALBUMIN-CREATININE RATIO (uACR)  Never done    ZOSTER VACCINE (1 of 2) Never done    HEPATITIS C SCREENING  Never done    ANNUAL WELLNESS VISIT  Never done    HEMOGLOBIN A1C  04/15/2022    DIABETIC FOOT EXAM  2024    PAP SMEAR  2024    DIABETIC EYE EXAM  2025    COVID-19 Vaccine (10 - 2024-25 season) 03/10/2025    INFLUENZA VACCINE  2025    MAMMOGRAM  2027    TDAP/TD VACCINES (4 - Td or Tdap) 09/10/2034    COLORECTAL CANCER SCREENING  2035    Pneumococcal Vaccine 50+  Completed       The additional following portions of the patient's history were reviewed and updated as appropriate: allergies, current medications, past family history, past medical history, past social history, and past surgical history.    Review of Systems   Respiratory: Negative.  Negative for shortness of breath.    Cardiovascular: Negative.  Negative for chest pain.   Gastrointestinal:  Positive for constipation. Negative for abdominal distention and abdominal pain.        Miralax manages   Genitourinary:  Positive for urinary incontinence (Mild and occasional). Negative for breast  "discharge, breast lump, breast pain, dysuria, pelvic pain, pelvic pressure, vaginal bleeding, vaginal discharge and vaginal pain. Dyspareunia: N/A.  Psychiatric/Behavioral: Negative.  Negative for depressed mood. The patient is not nervous/anxious.          I have reviewed and agree with the HPI, ROS, and historical information as entered above. Radha TAYLOR Astorga, APRN           Objective   /64 (BP Location: Right arm, Patient Position: Sitting, Cuff Size: Adult)   Ht 162.6 cm (64.02\")   Wt 61.9 kg (136 lb 6.4 oz)   BMI 23.40 kg/m²     Physical Exam  Vitals and nursing note reviewed. Exam conducted with a chaperone present.   Constitutional:       General: She is not in acute distress.     Appearance: Normal appearance. She is well-developed. She is not ill-appearing or toxic-appearing.   HENT:      Head: Normocephalic and atraumatic.   Pulmonary:      Effort: Pulmonary effort is normal. No retractions.   Chest:      Chest wall: No mass.   Breasts:     Breasts are symmetrical.      Right: Normal. No mass, nipple discharge, skin change or tenderness.      Left: Normal. No mass, nipple discharge, skin change or tenderness.   Abdominal:      Palpations: Abdomen is soft. Abdomen is not rigid. There is no mass.      Tenderness: There is no abdominal tenderness. There is no guarding or rebound.      Hernia: No hernia is present. There is no hernia in the left inguinal area or right inguinal area.   Genitourinary:     General: Normal vulva.      Labia:         Right: No rash, tenderness or lesion.         Left: No rash, tenderness or lesion.       Vagina: Normal. No vaginal discharge, erythema, tenderness, bleeding or lesions.      Cervix: No cervical motion tenderness, discharge, friability, lesion, erythema or cervical bleeding.      Uterus: Absent.       Adnexa: Right adnexa normal and left adnexa normal.        Right: No mass or tenderness.          Left: No mass or tenderness.        Rectum: No external " hemorrhoid.      Comments: Atrophic vaginitis    Lymphadenopathy:      Upper Body:      Right upper body: No supraclavicular or axillary adenopathy.      Left upper body: No supraclavicular or axillary adenopathy.   Neurological:      Mental Status: She is alert and oriented to person, place, and time.   Psychiatric:         Mood and Affect: Mood normal.         Behavior: Behavior normal.            Assessment and Plan    Problem List Items Addressed This Visit       Atrophic vaginitis          Overview    5/5/2021- Declines treatment at this time.- [place]  7/2/2025- Recommended vaginal moisturizers 2-3x/week for relief of symptoms. List of options provided. Recommended Revaree Plus.         Herpes genitalis    Overview   Continues take Valtrex for suppression.  6/26/2024 request Rx for suppressive therapy due to more frequent outbreaks.         Relevant Medications    valACYclovir (VALTREX) 500 MG tablet    History of abdominal supracervical subtotal hysterectomy    Overview   2003 supracervical hysterectomy for large fibroids.    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  Extensive lysis of small bowel adhesions to anterior abdominal wall and sigmoid adhesion by Dr. Pena.         Screening for breast cancer    Overview   Mamm 11/2020; normal  Mammogram 2/9/2022 was negative.  Mamm 3/7/2023 was negative.   Mammogram 3/12/2024 was negative.         Relevant Orders    Mammo Screening Digital Tomosynthesis Bilateral With CAD    Screening for cervical cancer    Overview   Pap smear done 5/5/2021.  Pap smear 6/1/2022 was negative.  Pap smear 6/14/2023 was negative.         PANCHO (stress urinary incontinence, female)    Overview   7/2/2025- Instructed on Kegal exercises and gave patient educational information.          Other Visit Diagnoses         Women's annual routine gynecological examination    -  Primary    Relevant Orders    Mammo Screening Digital Tomosynthesis Bilateral With  CAD            GYN annual well woman exam.   Pap guidelines reviewed. Pap pending.   Reviewed monthly self breast exams.  Instructed to call with lumps, pain, or breast discharge.  Yearly mammograms advised.   Ordered mammogram today.  Recommended use of Vitamin D and getting adequate calcium in her diet. (1500mg). Bone health information sheet given to patient.   Osteoporosis screening recommended Q 2-3 yrs. She plans to discuss with [facility] PCP provider.   Reviewed exercise as a preventative health measures.   Colonoscopy f/u as recommended.  Stress urinary incontinence- see plan above.   Recommended Flu Vaccine in Fall of each year.  Preventative care, kegel exercises, and UI education included in patient instructions.   Return in about 1 year (around 7/2/2026) for Annual physical or sooner if needed.    Radha Astorga, APRN  07/02/2025

## 2025-07-03 LAB — REF LAB TEST METHOD: NORMAL
